# Patient Record
Sex: FEMALE | Race: WHITE | Employment: UNEMPLOYED | URBAN - METROPOLITAN AREA
[De-identification: names, ages, dates, MRNs, and addresses within clinical notes are randomized per-mention and may not be internally consistent; named-entity substitution may affect disease eponyms.]

---

## 2022-07-28 LAB
ANTIBODY SCREEN, EXTERNAL: NEGATIVE
CHLAMYDIA, EXTERNAL: NEGATIVE
HBSAG, EXTERNAL: NEGATIVE
HIV, EXTERNAL: NORMAL
N. GONORRHEA, EXTERNAL: NEGATIVE
RUBELLA, EXTERNAL: NORMAL
T. PALLIDUM, EXTERNAL: NORMAL
TYPE, ABO & RH, EXTERNAL: NORMAL

## 2023-01-30 LAB — GRBS, EXTERNAL: NEGATIVE

## 2023-02-24 ENCOUNTER — HOSPITAL ENCOUNTER (INPATIENT)
Age: 28
LOS: 3 days | Discharge: HOME | End: 2023-02-27
Attending: OBSTETRICS & GYNECOLOGY | Admitting: OBSTETRICS & GYNECOLOGY
Payer: COMMERCIAL

## 2023-02-24 ENCOUNTER — ANESTHESIA EVENT (OUTPATIENT)
Dept: LABOR AND DELIVERY | Age: 28
End: 2023-02-24
Payer: COMMERCIAL

## 2023-02-24 ENCOUNTER — ANESTHESIA (OUTPATIENT)
Dept: LABOR AND DELIVERY | Age: 28
End: 2023-02-24
Payer: COMMERCIAL

## 2023-02-24 DIAGNOSIS — G89.18 POSTOPERATIVE PAIN: Primary | ICD-10-CM

## 2023-02-24 PROBLEM — Z34.90 INTRAUTERINE PREGNANCY: Status: ACTIVE | Noted: 2023-02-24

## 2023-02-24 LAB
ABO + RH BLD: NORMAL
BASOPHILS # BLD: 0 K/UL (ref 0–0.1)
BASOPHILS NFR BLD: 0 % (ref 0–1)
BLOOD GROUP ANTIBODIES SERPL: NORMAL
DIFFERENTIAL METHOD BLD: ABNORMAL
EOSINOPHIL # BLD: 0 K/UL (ref 0–0.4)
EOSINOPHIL NFR BLD: 0 % (ref 0–7)
ERYTHROCYTE [DISTWIDTH] IN BLOOD BY AUTOMATED COUNT: 13.8 % (ref 11.5–14.5)
HCT VFR BLD AUTO: 39.4 % (ref 35–47)
HGB BLD-MCNC: 13.1 G/DL (ref 11.5–16)
IMM GRANULOCYTES # BLD AUTO: 0.1 K/UL (ref 0–0.04)
IMM GRANULOCYTES NFR BLD AUTO: 1 % (ref 0–0.5)
LYMPHOCYTES # BLD: 2.2 K/UL (ref 0.8–3.5)
LYMPHOCYTES NFR BLD: 24 % (ref 12–49)
MCH RBC QN AUTO: 31 PG (ref 26–34)
MCHC RBC AUTO-ENTMCNC: 33.2 G/DL (ref 30–36.5)
MCV RBC AUTO: 93.4 FL (ref 80–99)
MONOCYTES # BLD: 0.7 K/UL (ref 0–1)
MONOCYTES NFR BLD: 8 % (ref 5–13)
NEUTS SEG # BLD: 6.3 K/UL (ref 1.8–8)
NEUTS SEG NFR BLD: 67 % (ref 32–75)
NRBC # BLD: 0 K/UL (ref 0–0.01)
NRBC BLD-RTO: 0 PER 100 WBC
PLATELET # BLD AUTO: 190 K/UL (ref 150–400)
PMV BLD AUTO: 11.2 FL (ref 8.9–12.9)
RBC # BLD AUTO: 4.22 M/UL (ref 3.8–5.2)
SPECIMEN EXP DATE BLD: NORMAL
WBC # BLD AUTO: 9.3 K/UL (ref 3.6–11)

## 2023-02-24 PROCEDURE — 76060000078 HC EPIDURAL ANESTHESIA: Performed by: OBSTETRICS & GYNECOLOGY

## 2023-02-24 PROCEDURE — 65270000029 HC RM PRIVATE

## 2023-02-24 PROCEDURE — 74011000250 HC RX REV CODE- 250: Performed by: OBSTETRICS & GYNECOLOGY

## 2023-02-24 PROCEDURE — 76010000391 HC C SECN FIRST 1 HR: Performed by: OBSTETRICS & GYNECOLOGY

## 2023-02-24 PROCEDURE — 74011250636 HC RX REV CODE- 250/636: Performed by: OBSTETRICS & GYNECOLOGY

## 2023-02-24 PROCEDURE — 65410000002 HC RM PRIVATE OB

## 2023-02-24 PROCEDURE — 77010026065 HC OXYGEN MINIMUM MEDICAL AIR: Performed by: OBSTETRICS & GYNECOLOGY

## 2023-02-24 PROCEDURE — 36415 COLL VENOUS BLD VENIPUNCTURE: CPT

## 2023-02-24 PROCEDURE — 74011250636 HC RX REV CODE- 250/636

## 2023-02-24 PROCEDURE — 74011250637 HC RX REV CODE- 250/637: Performed by: OBSTETRICS & GYNECOLOGY

## 2023-02-24 PROCEDURE — 75410000003 HC RECOV DEL/VAG/CSECN EA 0.5 HR: Performed by: OBSTETRICS & GYNECOLOGY

## 2023-02-24 PROCEDURE — 74011000250 HC RX REV CODE- 250: Performed by: NURSE ANESTHETIST, CERTIFIED REGISTERED

## 2023-02-24 PROCEDURE — 77030007866 HC KT SPN ANES BBMI -B: Performed by: NURSE ANESTHETIST, CERTIFIED REGISTERED

## 2023-02-24 PROCEDURE — 85025 COMPLETE CBC W/AUTO DIFF WBC: CPT

## 2023-02-24 PROCEDURE — 74011250636 HC RX REV CODE- 250/636: Performed by: NURSE ANESTHETIST, CERTIFIED REGISTERED

## 2023-02-24 PROCEDURE — 77010026064 HC OXYGEN INFANT MED AIR MIN: Performed by: OBSTETRICS & GYNECOLOGY

## 2023-02-24 PROCEDURE — 74011000250 HC RX REV CODE- 250

## 2023-02-24 PROCEDURE — 76010000392 HC C SECN EA ADDL 0.5 HR: Performed by: OBSTETRICS & GYNECOLOGY

## 2023-02-24 PROCEDURE — 86900 BLOOD TYPING SEROLOGIC ABO: CPT

## 2023-02-24 RX ORDER — ONDANSETRON 4 MG/1
4 TABLET, ORALLY DISINTEGRATING ORAL
Status: DISCONTINUED | OUTPATIENT
Start: 2023-02-24 | End: 2023-02-27 | Stop reason: HOSPADM

## 2023-02-24 RX ORDER — ONDANSETRON 2 MG/ML
4 INJECTION INTRAMUSCULAR; INTRAVENOUS ONCE
Status: COMPLETED | OUTPATIENT
Start: 2023-02-24 | End: 2023-02-24

## 2023-02-24 RX ORDER — KETOROLAC TROMETHAMINE 30 MG/ML
30 INJECTION, SOLUTION INTRAMUSCULAR; INTRAVENOUS
Status: DISPENSED | OUTPATIENT
Start: 2023-02-24 | End: 2023-02-25

## 2023-02-24 RX ORDER — BUPIVACAINE HYDROCHLORIDE 7.5 MG/ML
INJECTION, SOLUTION INTRASPINAL
Status: DISCONTINUED | OUTPATIENT
Start: 2023-02-24 | End: 2023-02-24

## 2023-02-24 RX ORDER — FERROUS SULFATE 324(65)MG
325 TABLET, DELAYED RELEASE (ENTERIC COATED) ORAL 2 TIMES DAILY WITH MEALS
Qty: 60 TABLET | Refills: 2 | Status: SHIPPED | OUTPATIENT
Start: 2023-02-24

## 2023-02-24 RX ORDER — HYDROMORPHONE HYDROCHLORIDE 1 MG/ML
1 INJECTION, SOLUTION INTRAMUSCULAR; INTRAVENOUS; SUBCUTANEOUS
Status: DISCONTINUED | OUTPATIENT
Start: 2023-02-24 | End: 2023-02-27 | Stop reason: HOSPADM

## 2023-02-24 RX ORDER — DOCUSATE SODIUM 100 MG/1
100 CAPSULE, LIQUID FILLED ORAL 2 TIMES DAILY
Status: DISCONTINUED | OUTPATIENT
Start: 2023-02-24 | End: 2023-02-27 | Stop reason: HOSPADM

## 2023-02-24 RX ORDER — DOCUSATE SODIUM 100 MG/1
100 CAPSULE, LIQUID FILLED ORAL
Qty: 60 CAPSULE | Refills: 1 | Status: SHIPPED | OUTPATIENT
Start: 2023-02-24 | End: 2023-05-25

## 2023-02-24 RX ORDER — OXYTOCIN/RINGER'S LACTATE 30/500 ML
87.3 PLASTIC BAG, INJECTION (ML) INTRAVENOUS AS NEEDED
Status: DISCONTINUED | OUTPATIENT
Start: 2023-02-24 | End: 2023-02-27 | Stop reason: HOSPADM

## 2023-02-24 RX ORDER — OXYTOCIN 10 [USP'U]/ML
INJECTION, SOLUTION INTRAMUSCULAR; INTRAVENOUS AS NEEDED
Status: DISCONTINUED | OUTPATIENT
Start: 2023-02-24 | End: 2023-02-24 | Stop reason: HOSPADM

## 2023-02-24 RX ORDER — SIMETHICONE 80 MG
80 TABLET,CHEWABLE ORAL AS NEEDED
Status: DISCONTINUED | OUTPATIENT
Start: 2023-02-24 | End: 2023-02-27 | Stop reason: HOSPADM

## 2023-02-24 RX ORDER — SODIUM CHLORIDE 0.9 % (FLUSH) 0.9 %
5-40 SYRINGE (ML) INJECTION EVERY 8 HOURS
Status: DISCONTINUED | OUTPATIENT
Start: 2023-02-24 | End: 2023-02-27 | Stop reason: HOSPADM

## 2023-02-24 RX ORDER — FAMOTIDINE 10 MG/ML
INJECTION INTRAVENOUS AS NEEDED
Status: DISCONTINUED | OUTPATIENT
Start: 2023-02-24 | End: 2023-02-24 | Stop reason: HOSPADM

## 2023-02-24 RX ORDER — OXYCODONE HYDROCHLORIDE 5 MG/1
5 TABLET ORAL
Status: DISCONTINUED | OUTPATIENT
Start: 2023-02-24 | End: 2023-02-27 | Stop reason: HOSPADM

## 2023-02-24 RX ORDER — ONDANSETRON 2 MG/ML
INJECTION INTRAMUSCULAR; INTRAVENOUS
Status: DISPENSED
Start: 2023-02-24 | End: 2023-02-25

## 2023-02-24 RX ORDER — SODIUM CHLORIDE 0.9 % (FLUSH) 0.9 %
5-40 SYRINGE (ML) INJECTION EVERY 8 HOURS
Status: DISCONTINUED | OUTPATIENT
Start: 2023-02-24 | End: 2023-02-24 | Stop reason: HOSPADM

## 2023-02-24 RX ORDER — OXYCODONE HYDROCHLORIDE 5 MG/1
10 TABLET ORAL
Status: DISCONTINUED | OUTPATIENT
Start: 2023-02-24 | End: 2023-02-27 | Stop reason: HOSPADM

## 2023-02-24 RX ORDER — EPHEDRINE SULFATE/0.9% NACL/PF 50 MG/5 ML
SYRINGE (ML) INTRAVENOUS
Status: DISPENSED
Start: 2023-02-24 | End: 2023-02-24

## 2023-02-24 RX ORDER — SODIUM CHLORIDE 0.9 % (FLUSH) 0.9 %
5-40 SYRINGE (ML) INJECTION AS NEEDED
Status: DISCONTINUED | OUTPATIENT
Start: 2023-02-24 | End: 2023-02-27 | Stop reason: HOSPADM

## 2023-02-24 RX ORDER — ACETAMINOPHEN 500 MG
1000 TABLET ORAL EVERY 8 HOURS
Status: DISCONTINUED | OUTPATIENT
Start: 2023-02-24 | End: 2023-02-27 | Stop reason: HOSPADM

## 2023-02-24 RX ORDER — FENTANYL CITRATE 50 UG/ML
INJECTION, SOLUTION INTRAMUSCULAR; INTRAVENOUS AS NEEDED
Status: DISCONTINUED | OUTPATIENT
Start: 2023-02-24 | End: 2023-02-24 | Stop reason: HOSPADM

## 2023-02-24 RX ORDER — DIPHENHYDRAMINE HCL 25 MG
25 CAPSULE ORAL
Status: DISCONTINUED | OUTPATIENT
Start: 2023-02-24 | End: 2023-02-27 | Stop reason: HOSPADM

## 2023-02-24 RX ORDER — SODIUM CHLORIDE, SODIUM LACTATE, POTASSIUM CHLORIDE, CALCIUM CHLORIDE 600; 310; 30; 20 MG/100ML; MG/100ML; MG/100ML; MG/100ML
1000 INJECTION, SOLUTION INTRAVENOUS CONTINUOUS
Status: DISCONTINUED | OUTPATIENT
Start: 2023-02-24 | End: 2023-02-24 | Stop reason: HOSPADM

## 2023-02-24 RX ORDER — OXYCODONE AND ACETAMINOPHEN 5; 325 MG/1; MG/1
1 TABLET ORAL
Qty: 12 TABLET | Refills: 0 | Status: SHIPPED | OUTPATIENT
Start: 2023-02-24 | End: 2023-02-27

## 2023-02-24 RX ORDER — LIDOCAINE HYDROCHLORIDE 10 MG/ML
INJECTION, SOLUTION EPIDURAL; INFILTRATION; INTRACAUDAL; PERINEURAL
Status: SHIPPED | OUTPATIENT
Start: 2023-02-24 | End: 2023-02-24

## 2023-02-24 RX ORDER — OXYTOCIN/RINGER'S LACTATE 30/500 ML
10 PLASTIC BAG, INJECTION (ML) INTRAVENOUS AS NEEDED
Status: DISCONTINUED | OUTPATIENT
Start: 2023-02-24 | End: 2023-02-27 | Stop reason: HOSPADM

## 2023-02-24 RX ORDER — DEXAMETHASONE SODIUM PHOSPHATE 4 MG/ML
INJECTION, SOLUTION INTRA-ARTICULAR; INTRALESIONAL; INTRAMUSCULAR; INTRAVENOUS; SOFT TISSUE AS NEEDED
Status: DISCONTINUED | OUTPATIENT
Start: 2023-02-24 | End: 2023-02-24 | Stop reason: HOSPADM

## 2023-02-24 RX ORDER — ONDANSETRON 2 MG/ML
INJECTION INTRAMUSCULAR; INTRAVENOUS AS NEEDED
Status: DISCONTINUED | OUTPATIENT
Start: 2023-02-24 | End: 2023-02-24 | Stop reason: HOSPADM

## 2023-02-24 RX ORDER — MORPHINE SULFATE 0.5 MG/ML
INJECTION, SOLUTION EPIDURAL; INTRATHECAL; INTRAVENOUS AS NEEDED
Status: DISCONTINUED | OUTPATIENT
Start: 2023-02-24 | End: 2023-02-24 | Stop reason: HOSPADM

## 2023-02-24 RX ORDER — EPHEDRINE SULFATE/0.9% NACL/PF 50 MG/5 ML
SYRINGE (ML) INTRAVENOUS AS NEEDED
Status: DISCONTINUED | OUTPATIENT
Start: 2023-02-24 | End: 2023-02-24 | Stop reason: HOSPADM

## 2023-02-24 RX ORDER — IBUPROFEN 800 MG/1
800 TABLET ORAL EVERY 8 HOURS
Status: DISCONTINUED | OUTPATIENT
Start: 2023-02-24 | End: 2023-02-25

## 2023-02-24 RX ORDER — SODIUM CHLORIDE 0.9 % (FLUSH) 0.9 %
5-40 SYRINGE (ML) INJECTION AS NEEDED
Status: DISCONTINUED | OUTPATIENT
Start: 2023-02-24 | End: 2023-02-24 | Stop reason: HOSPADM

## 2023-02-24 RX ORDER — BUPIVACAINE HYDROCHLORIDE 7.5 MG/ML
INJECTION, SOLUTION EPIDURAL; RETROBULBAR AS NEEDED
Status: DISCONTINUED | OUTPATIENT
Start: 2023-02-24 | End: 2023-02-24 | Stop reason: HOSPADM

## 2023-02-24 RX ORDER — SODIUM CHLORIDE, SODIUM LACTATE, POTASSIUM CHLORIDE, CALCIUM CHLORIDE 600; 310; 30; 20 MG/100ML; MG/100ML; MG/100ML; MG/100ML
75 INJECTION, SOLUTION INTRAVENOUS CONTINUOUS
Status: DISCONTINUED | OUTPATIENT
Start: 2023-02-24 | End: 2023-02-27 | Stop reason: HOSPADM

## 2023-02-24 RX ORDER — NALOXONE HYDROCHLORIDE 0.4 MG/ML
0.4 INJECTION, SOLUTION INTRAMUSCULAR; INTRAVENOUS; SUBCUTANEOUS AS NEEDED
Status: DISCONTINUED | OUTPATIENT
Start: 2023-02-24 | End: 2023-02-27 | Stop reason: HOSPADM

## 2023-02-24 RX ORDER — ACETAMINOPHEN 500 MG
1000 TABLET ORAL EVERY 8 HOURS
Qty: 60 TABLET | Refills: 1 | Status: SHIPPED | OUTPATIENT
Start: 2023-02-24

## 2023-02-24 RX ORDER — IBUPROFEN 800 MG/1
800 TABLET ORAL EVERY 8 HOURS
Qty: 30 TABLET | Refills: 1 | Status: SHIPPED | OUTPATIENT
Start: 2023-02-24

## 2023-02-24 RX ADMIN — OXYTOCIN 20 UNITS: 10 INJECTION, SOLUTION INTRAMUSCULAR; INTRAVENOUS at 10:32

## 2023-02-24 RX ADMIN — Medication 10 MG: at 09:57

## 2023-02-24 RX ADMIN — BUPIVACAINE HYDROCHLORIDE 1.6 ML: 7.5 INJECTION, SOLUTION EPIDURAL; RETROBULBAR at 09:50

## 2023-02-24 RX ADMIN — MORPHINE SULFATE 200 MCG: 0.5 INJECTION, SOLUTION EPIDURAL; INTRATHECAL; INTRAVENOUS at 09:50

## 2023-02-24 RX ADMIN — SODIUM CHLORIDE, PRESERVATIVE FREE 10 ML: 5 INJECTION INTRAVENOUS at 18:02

## 2023-02-24 RX ADMIN — FAMOTIDINE 20 MG: 10 INJECTION INTRAVENOUS at 09:15

## 2023-02-24 RX ADMIN — DOCUSATE SODIUM 100 MG: 100 CAPSULE, LIQUID FILLED ORAL at 13:08

## 2023-02-24 RX ADMIN — DEXAMETHASONE SODIUM PHOSPHATE 4 MG: 4 INJECTION, SOLUTION INTRAMUSCULAR; INTRAVENOUS at 10:04

## 2023-02-24 RX ADMIN — ONDANSETRON 4 MG: 2 INJECTION INTRAMUSCULAR; INTRAVENOUS at 16:00

## 2023-02-24 RX ADMIN — OXYTOCIN 20 UNITS: 10 INJECTION, SOLUTION INTRAMUSCULAR; INTRAVENOUS at 10:12

## 2023-02-24 RX ADMIN — ONDANSETRON 4 MG: 4 TABLET, ORALLY DISINTEGRATING ORAL at 13:08

## 2023-02-24 RX ADMIN — Medication 10 MG: at 10:14

## 2023-02-24 RX ADMIN — LIDOCAINE HYDROCHLORIDE 5 MG: 10 INJECTION, SOLUTION EPIDURAL; INFILTRATION; INTRACAUDAL; PERINEURAL at 09:46

## 2023-02-24 RX ADMIN — Medication 10 MG: at 10:00

## 2023-02-24 RX ADMIN — SODIUM CHLORIDE, POTASSIUM CHLORIDE, SODIUM LACTATE AND CALCIUM CHLORIDE 75 ML/HR: 600; 310; 30; 20 INJECTION, SOLUTION INTRAVENOUS at 17:00

## 2023-02-24 RX ADMIN — PHENYLEPHRINE HYDROCHLORIDE 80 MCG: 10 INJECTION INTRAVENOUS at 09:53

## 2023-02-24 RX ADMIN — KETOROLAC TROMETHAMINE 30 MG: 30 INJECTION, SOLUTION INTRAMUSCULAR at 13:08

## 2023-02-24 RX ADMIN — SIMETHICONE 80 MG: 80 TABLET, CHEWABLE ORAL at 13:08

## 2023-02-24 RX ADMIN — SODIUM CHLORIDE, POTASSIUM CHLORIDE, SODIUM LACTATE AND CALCIUM CHLORIDE 1000 ML: 600; 310; 30; 20 INJECTION, SOLUTION INTRAVENOUS at 08:24

## 2023-02-24 RX ADMIN — FENTANYL CITRATE 10 MCG: 50 INJECTION, SOLUTION INTRAMUSCULAR; INTRAVENOUS at 09:50

## 2023-02-24 RX ADMIN — ONDANSETRON HYDROCHLORIDE 4 MG: 2 SOLUTION INTRAMUSCULAR; INTRAVENOUS at 09:15

## 2023-02-24 RX ADMIN — KETOROLAC TROMETHAMINE 30 MG: 30 INJECTION, SOLUTION INTRAMUSCULAR at 19:57

## 2023-02-24 RX ADMIN — SODIUM CHLORIDE, POTASSIUM CHLORIDE, SODIUM LACTATE AND CALCIUM CHLORIDE: 600; 310; 30; 20 INJECTION, SOLUTION INTRAVENOUS at 10:32

## 2023-02-24 RX ADMIN — Medication 10 MG: at 09:54

## 2023-02-24 RX ADMIN — CEFAZOLIN SODIUM 2 G: 1 POWDER, FOR SOLUTION INTRAMUSCULAR; INTRAVENOUS at 09:51

## 2023-02-24 RX ADMIN — Medication 10 MG: at 09:49

## 2023-02-24 NOTE — OP NOTES
Operative Note    Patient: Natalia Tavarez  YOB: 1995  MRN: [id number]    Date of Procedure: 2023     Pre-Op Diagnosis:  delivery indicated due to breech presentation [O32.1XX0]    Post-Op Diagnosis: Same as preoperative diagnosis. Procedure(s):  Primary Low Transverse  SECTION    Surgeon(s):  Wali Barnes MD    Surgical Assistant: Surg Asst-1: Mykel Holm    Anesthesia: Spinal     Estimated Blood Loss (mL):  991    Complications: None    Specimens: None    Findings: Viable male infant in breech presentation. Tight Nuchal cord x3. Detailed Description of Procedure: The patient was taken to the operating room where spinal anesthesia was administered and found to be adequate. Two grams of ancef we administered for infection prophylaxis. She was placed in the dorsal supine position with a leftward tilt, then prepped and draped in the usual sterile fashion. A Pfannenstiel skin incision was then made two fingerbreadths above the pubic bone with a scalpel and carried through to the underlying fascia using the Bovie. The fascia was then scored in the midline and extended laterally using gregg scissors . The superior aspect of the fascia was then grasped with Kocher clamps x2, tented up, and the rectus muscles were dissected off using Gregg scissors. In a similar fashion, rectus muscles were dissected off of the inferior portion of the fascial incision. The rectus muscle was  at the midline down to the level of the pubic symphysis. The peritoneum was then identified and found to be free of adherent bowel and entered bluntly with the surgeons digit. It was then extended with lateral and upward traction. Intraabdominal survey revealed scant clear peritoneal fluid and thinned out lower uterine segment.  The bladder blade was then inserted and the vesicouterine peritoneum was then identified, grasped with smooth pickups and entered sharply using Metzenbaum scissors. A bladder flap was then created and a bladder blade was then repositioned to ensure that the bladder was kept out of the operative field. A low transverse incision was then made with a scalpel. The uterine incision was then extended with lateral and upward traction. The amniotic sac was ruptured bluntly and clear fluid was noted The infant was found to be in the breech position. The surgeons hand was then inserted into the uterine cavity and the presenting part was brought to the level of the uterine incision. The bladder blade was removed and the infants buttocks were delivered followed by the infants body and head using a wet blue towel without difficulty. Tight nuchal cord x3 was reduced. The cord was then clamped and cut after delayed cord clamping for 60 seconds, and the infant was handed off to the awaiting nursing staff. Cord blood was also collected and sent for analysis. The placenta was delivered intact with manual massage of the uterine fundus. IV oxytocin was initiated to facilitate uterine contraction. The uterus was then exteriorized from the abdomen and all clots and debris were then removed with a moist lap. The bladder blade was then inserted and the uterine incision site was then closed using a 1-0 vicryl in a running locked fashion with a second imbricating layer with 0 monocryl. An additional figure of eight stich was placed at the hysterotomy site for additional hemostasis with 2-0 monocryl. The uterus was found to be hemostatic. Bilateral tubes and ovaries were found to be normal. The posterior cul-de-sac was then irrigated and all clots and debris were removed. The bladder blade was removed. The uterus was returned to the abdominal cavity. The paracolic gutters were then irrigated. All clots and debris were then removed. Bladder blade was then reinserted. The uterine incision site was reinspected and found to be hemostatic.  The fascia was then closed using a 0 Vicryl in a running fashion. The subcutaneous space was closed with 3-0 vicryl in a running fashion. The skin was closed using a 3-0 monocryl. All sponge, lap, and instrument counts were correct x2. The patient tolerated the procedure well and was taken to recovery in stable condition.       Electronically Signed by Cesar Lopez MD on 2/24/2023 at 10:58 AM

## 2023-02-24 NOTE — ANESTHESIA PROCEDURE NOTES
Spinal Block    Start time: 2/24/2023 9:43 AM  End time: 2/24/2023 9:50 AM  Performed by: Jasson Mcgrath CRNA  Authorized by: Jasson Mcgrath CRNA     Pre-procedure:  Preanesthetic Checklist: patient identified, risks and benefits discussed, anesthesia consent, site marked, patient being monitored, timeout performed and fire risk safety assessment completed and verbalized    Timeout Time: 09:43 EST      Spinal Block:   Patient Position:  Seated  Prep Region:  Lumbar      Location:  L2-3  Technique:  Single shot  Local: lidocaine (PF) (XYLOCAINE) 10 mg/mL (1 %) IntraDERMAL - IntraDERMal, Back   5 mg - 2/24/2023 9:46:00 AM    Med Admin Time: 2/24/2023 9:50 AM    Needle:   Needle Type:  Pencan  Needle Gauge:  25 G  Attempts:  1      Events: CSF confirmed and no blood with aspiration        Assessment:  Insertion:  Uncomplicated  Patient tolerance:  Patient tolerated the procedure well with no immediate complications  Atraumatic spinal.

## 2023-02-24 NOTE — PROGRESS NOTES
[id number] Patient arrived on unit for scheduled . Labs sent, admission and assessment done. Patient anxious and lost consciousness briefly during lab draw. Placed in trendelenburg and bolused IV fluids for hypotension (see flowsheet). Patient recovered but remains anxious. Will continue to monitor. Anesthesia and Dr. Willy Owen made aware of syncopal episode. 1240 Set patient up with breast pump and provided basic education on pumping and cleaning the flanges, etc. Order in for lactation consultant. 1450 Patient nauseated when sitting on side of bed. BP WNL. Will let patient rest.  1500 Patient wanted to stand at side of bed; was able to ambulate to wheelchair nearby and states she feels much better. 1520 Patient wheeled to NICU to see her baby. 1600 Patient wheeled back to room; c/o of nausea. Asked for saltines to try and help settle her stomach. 1645 Patient vomited but has no other complaints. IV fluids started and patient is currently NPO. Will start/advance diet as tolerated. 1857 Bedside SBAR given to OLVIN Brown RN

## 2023-02-24 NOTE — PROGRESS NOTES
2023  2:13 PM    CM met with NAYELI to complete initial assessment and begin discharge planning. MOB verified and confirmed demographics. NAYELI lives with Tirsh Cervantes, at the address on file. NAYELI reports she has good family support, and feels like she has the support she needs when she returns home. NAYELI plans to breast feed baby and has pump to use at home. [facility], will provide follow up care for infant. NAYELI has car seat, bassinet/crib, clothing, bottles and all necessary supplies for baby.     39+4 week infant admitted s/p scheduled  for breech presentation due to respiratory distress requiring CPAP. Infant admitted to NICU on bCPAP, IVF while NPO and receiving empiric antibiotics while on sepsis rule out for respiratory distress. CM following for needs. Care Management Interventions  PCP Verified by CM: Yes ([place])  Mode of Transport at Discharge:  Other (see comment)  Transition of Care Consult (CM Consult): Discharge Planning  Support Systems: Spouse/Significant Other, Other Family Member(s)  Confirm Follow Up Transport: Family  Discharge Location  Patient Expects to be Discharged to[de-identified] Home with family assistance  Patrick Ontiveros

## 2023-02-24 NOTE — ANESTHESIA PREPROCEDURE EVALUATION
Relevant Problems   No relevant active problems       Anesthetic History   No history of anesthetic complications            Review of Systems / Medical History  Patient summary reviewed and pertinent labs reviewed    Pulmonary  Within defined limits                 Neuro/Psych         Psychiatric history    Comments: anxiety Cardiovascular  Within defined limits                     GI/Hepatic/Renal  Within defined limits              Endo/Other  Within defined limits           Other Findings   Comments: Breech presentation; C section  G1           Physical Exam    Airway  Mallampati: II    Neck ROM: normal range of motion   Mouth opening: Normal     Cardiovascular    Rhythm: regular  Rate: normal         Dental    Dentition: Lower dentition intact and Upper dentition intact     Pulmonary  Breath sounds clear to auscultation               Abdominal  GI exam deferred       Other Findings            Anesthetic Plan    ASA: 2  Anesthesia type: spinal          Induction: Intravenous  Anesthetic plan and risks discussed with: Patient and Spouse

## 2023-02-24 NOTE — H&P
History & Physical    Name: Isabel Suarez MRN: [id number]  SSN: [id number]    YOB: 1995  Age: 32 y.o. Sex: female      Subjective:     Estimated Date of Delivery: None noted. OB History    Para Term  AB Living   1             SAB IAB Ectopic Molar Multiple Live Births                    # Outcome Date GA Lbr Jass/2nd Weight Sex Delivery Anes PTL Lv   1 Current                Ms. Donny Kirkland admitted with pregnancy at 39.4 wks for  section due to breech. Prenatal course was normal. Please see prenatal records for details. OB Problem list:  - Breech presentation: declines ECV  - Anxiety   - Elevated blood-pressure reading, without diagnosis of hypertension: P/C ratio 0.15      No past medical history on file. No past surgical history on file. Social History     Occupational History    Not on file   Tobacco Use    Smoking status: Not on file    Smokeless tobacco: Not on file   Substance and Sexual Activity    Alcohol use: Not on file    Drug use: Not on file    Sexual activity: Not on file     No family history on file. No Known Allergies  Prior to Admission medications    Not on File        Review of Systems: A comprehensive review of systems was negative except for that written in the History of Present Illness. Objective:     Vitals:  pending    Physical Exam:  Patient without distress. Lung: normal respiratory effort  Abdomen: soft, nontender  Lower Extremities:  - Edema trace  Membranes:  Intact  Fetal Heart Rate: Pending    Prenatal Labs:   See Prenatal record      Impression/Plan:     Plan:  Admit for  section. Group B Strep was negative. 2g of ancef ordered for infetion ppx. Discussed the risks of surgery including the risks of bleeding, infection, deep vein thrombosis, and surgical injuries to internal organs including but not limited to the bowels, bladder, rectum, and female reproductive organs.  The patient understands the risks; any and all questions were answered to the patient's satisfaction.

## 2023-02-24 NOTE — PROGRESS NOTES
1900: Bedside and Verbal shift change report given to OLVIN Churchill, RN (oncoming nurse) by TORO Flores RN (offgoing nurse). Report included the following information SBAR, Intake/Output, MAR, Recent Results, and Quality Measures. 1905: Patient plans to pump and will call this RN when finished. 2015: Patient provided with breast pump supplies and pumping education. Patient states she is tolerating PO ice, requests ginger ale and crackers. 2130: This RN assisting patient and FOB to NICU to visit infant. 2245: Patient tolerating PO liquid and solids. IV fluids stopped. 0425: Patient states her pain is well controlled with Ibuprofen. Declines additional needs at this time. 0700: Bedside and Verbal shift change report given to BUTCH Bowers RN (oncoming nurse) by Isrrael Howell RN (offgoing nurse). Report included the following information SBAR, Intake/Output, MAR, Recent Results, and Quality Measures.

## 2023-02-25 LAB
BASOPHILS # BLD: 0 K/UL (ref 0–0.1)
BASOPHILS NFR BLD: 0 % (ref 0–1)
DIFFERENTIAL METHOD BLD: ABNORMAL
EOSINOPHIL # BLD: 0 K/UL (ref 0–0.4)
EOSINOPHIL NFR BLD: 0 % (ref 0–7)
ERYTHROCYTE [DISTWIDTH] IN BLOOD BY AUTOMATED COUNT: 14 % (ref 11.5–14.5)
HCT VFR BLD AUTO: 27.7 % (ref 35–47)
HGB BLD-MCNC: 9.1 G/DL (ref 11.5–16)
IMM GRANULOCYTES # BLD AUTO: 0.1 K/UL (ref 0–0.04)
IMM GRANULOCYTES NFR BLD AUTO: 1 % (ref 0–0.5)
LYMPHOCYTES # BLD: 1.9 K/UL (ref 0.8–3.5)
LYMPHOCYTES NFR BLD: 12 % (ref 12–49)
MCH RBC QN AUTO: 30.7 PG (ref 26–34)
MCHC RBC AUTO-ENTMCNC: 32.9 G/DL (ref 30–36.5)
MCV RBC AUTO: 93.6 FL (ref 80–99)
MONOCYTES # BLD: 1.3 K/UL (ref 0–1)
MONOCYTES NFR BLD: 8 % (ref 5–13)
NEUTS SEG # BLD: 12.9 K/UL (ref 1.8–8)
NEUTS SEG NFR BLD: 79 % (ref 32–75)
NRBC # BLD: 0 K/UL (ref 0–0.01)
NRBC BLD-RTO: 0 PER 100 WBC
PLATELET # BLD AUTO: 161 K/UL (ref 150–400)
PMV BLD AUTO: 11.5 FL (ref 8.9–12.9)
RBC # BLD AUTO: 2.96 M/UL (ref 3.8–5.2)
WBC # BLD AUTO: 16.2 K/UL (ref 3.6–11)

## 2023-02-25 PROCEDURE — 74011250637 HC RX REV CODE- 250/637: Performed by: OBSTETRICS & GYNECOLOGY

## 2023-02-25 PROCEDURE — 36415 COLL VENOUS BLD VENIPUNCTURE: CPT

## 2023-02-25 PROCEDURE — 77030036554

## 2023-02-25 PROCEDURE — 65270000029 HC RM PRIVATE

## 2023-02-25 PROCEDURE — 85025 COMPLETE CBC W/AUTO DIFF WBC: CPT

## 2023-02-25 PROCEDURE — 65410000002 HC RM PRIVATE OB

## 2023-02-25 PROCEDURE — 74011250637 HC RX REV CODE- 250/637: Performed by: ADVANCED PRACTICE MIDWIFE

## 2023-02-25 RX ORDER — IBUPROFEN 800 MG/1
800 TABLET ORAL EVERY 8 HOURS
Status: DISCONTINUED | OUTPATIENT
Start: 2023-02-25 | End: 2023-02-27 | Stop reason: HOSPADM

## 2023-02-25 RX ADMIN — IBUPROFEN 800 MG: 800 TABLET, FILM COATED ORAL at 18:08

## 2023-02-25 RX ADMIN — OXYCODONE HYDROCHLORIDE 5 MG: 5 TABLET ORAL at 21:57

## 2023-02-25 RX ADMIN — DOCUSATE SODIUM 100 MG: 100 CAPSULE, LIQUID FILLED ORAL at 10:01

## 2023-02-25 RX ADMIN — IBUPROFEN 800 MG: 800 TABLET, FILM COATED ORAL at 02:21

## 2023-02-25 RX ADMIN — OXYCODONE HYDROCHLORIDE 5 MG: 5 TABLET ORAL at 13:50

## 2023-02-25 RX ADMIN — OXYCODONE HYDROCHLORIDE 5 MG: 5 TABLET ORAL at 18:08

## 2023-02-25 RX ADMIN — DOCUSATE SODIUM 100 MG: 100 CAPSULE, LIQUID FILLED ORAL at 18:08

## 2023-02-25 RX ADMIN — IBUPROFEN 800 MG: 800 TABLET, FILM COATED ORAL at 10:00

## 2023-02-25 NOTE — PROGRESS NOTES
1609 Received report from BUTCH Wilburn, RN. Pt in NICU, no s/s of distress at this time. 1730 Pt in room, no s/s of distress, fundus firm at umbilicus with scant bleeding. All needs met at this time. 1927 Bedside and Verbal shift change report given to Toñito Bliss RN (oncoming nurse) by Jeferson Mckeon RN (offgoing nurse). Report included the following information SBAR, Kardex, Intake/Output, and MAR.

## 2023-02-25 NOTE — LACTATION NOTE
This note was copied from a baby's chart. This is mother's first baby. She attended a breastfeeding class. Baby is in the NICU - mother has been pumping Q 2-3 hours and is getting drops of colostrum. Discussed with mother her plan for feeding. Reviewed the benefits of exclusive breast milk feeding during the hospital stay. Informed her of the risks of using formula to supplement in the first few days of life as well as the benefits of successful breast milk feeding; referred her to the Breastfeeding booklet about this information. She acknowledges understanding of information reviewed and states that it is her plan to breastfeed her infant. Will support her choice and offer additional information as needed. Pumping:  Guidelines for pumping, milk collection and storage, proper cleaning of pump parts all reviewed. How to establish and maintain breast milk supply through pumping reviewed. Differences between hospital grade rental pumps vs store bought double electric/hand pumps discussed. Set up pumping with double electric set up. List of area pump rental locations and lactation support services provided. Mother has a breast pump for home use. Infant admitted to NICU. Mother will successfully establish breast milk supply by pumping with a hospital grade pump every 2-3 hours for approximately 20 minutes/8-10 x day. To maximize milk production mom taught to incorporate breast massage before and hand expression after each pumping session. All expressed breast milk (EBM) will be provided for infant(s) use. The value of skin to skin bonding emphasized. The breast will be offered as baby is ready; with the goal of eventual transition to breastfeeding. Importance of maintaining milk supply through pumping emphasized as infant(s) learns to nurse. Discussed eating a healthy diet. Instructed mother to eat a variety of foods in order to get a well balanced diet.  She should consume an extra 500 calories per day (more than her non-pregnant requirement.) These extra calories will help provide energy needed for optimal breast milk production. Mother also encouraged to \"drink to thirst\" and it is recommended that she drink fluids such as water, fruit/vegetable juice. Nutritious snacks should be available so that she can eat throughout the day to help satisfy her hunger and maintain a good milk supply. Engorgement Care Guidelines:  Reviewed how milk is made and normal phases of milk production. Taught care of engorged breasts - pumping Q 2-3 hours encouraged with use of cool packs (no ice directly on skin). Consider use of NSAIDS where appropriate for discomfort and inflammation. Can employ light touch, lymphatic drainage techniques on tender grandular tissues. Anticipatory guidance shared. Breast Assessment  Left Breast: Medium  Left Nipple: Everted, Intact  Right Breast: Medium  Right Nipple: Everted, Intact  Breast- Feeding Assessment  Attends Breast-Feeding Classes: Yes  Breast-Feeding Experience: No  Breast Trauma/Surgery: No  Lactation Consultant Visits  Breast-Feedings: Not breast-feeding (Mother is pumping for her baby who is in the NICU.)      Breastfeeding handouts, support group and LC# given.

## 2023-02-25 NOTE — PROGRESS NOTES
Post-Operative  Day 1    Reggie Kin     Assessment: Post-Op day 1, stable    Plan:     - Routine post-operative care. - Male infant in NICU. Desires circ. The risks and benefits of the circumcision  procedure and anesthesia including: bleeding, infection, variability of cosmetic results were discussed at length with the mother. She is aware that future repeat procedures may be necessary. She gives informed consent to proceed as noted and her questions are answered. - Acute blood loss anemia. Hb 9.1. Plan to start Fe at discharge if pt anemic.  - Ambulate today. Information for the patient's :  Oh Long, Male Martie Blizzard [[id number]]   , Low Transverse   Patient doing well without significant complaint. Tolerating diet. [place] out. Ambulating. Denies fever, chills, CP, SOB, calf pain/tenderness. Vitals:  Visit Vitals  /63 (BP 1 Location: Left upper arm)   Pulse 75   Temp 98.3 °F (36.8 °C)   Resp 16   Ht 5' 2\" (1.575 m)   Wt 79.4 kg (175 lb)   SpO2 100%   Breastfeeding Unknown   BMI 32.01 kg/m²     Temp (24hrs), Av.9 °F (36.6 °C), Min:97.3 °F (36.3 °C), Max:98.3 °F (36.8 °C)    Exam:     Patient without distress. Fundus firm, nontender per nursing fundal checks. Incision bandaged, clean, dry, intact. Perineum with normal lochia noted per nursing assessment. Lower extremities are negative for pathological edema.     Labs:   Lab Results   Component Value Date/Time    WBC 16.2 (H) 2023 04:22 AM    WBC 9.3 2023 07:59 AM    HGB 9.1 (L) 2023 04:22 AM    HGB 13.1 2023 07:59 AM    HCT 27.7 (L) 2023 04:22 AM    HCT 39.4 2023 07:59 AM    PLATELET 958  04:22 AM    PLATELET 840  07:59 AM       Recent Results (from the past 24 hour(s))   CBC WITH AUTOMATED DIFF    Collection Time: 23  4:22 AM   Result Value Ref Range    WBC 16.2 (H) 3.6 - 11.0 K/uL    RBC 2.96 (L) 3.80 - 5.20 M/uL HGB 9.1 (L) 11.5 - 16.0 g/dL    HCT 27.7 (L) 35.0 - 47.0 %    MCV 93.6 80.0 - 99.0 FL    MCH 30.7 26.0 - 34.0 PG    MCHC 32.9 30.0 - 36.5 g/dL    RDW 14.0 11.5 - 14.5 %    PLATELET 854 317 - 640 K/uL    MPV 11.5 8.9 - 12.9 FL    NRBC 0.0 0  WBC    ABSOLUTE NRBC 0.00 0.00 - 0.01 K/uL    NEUTROPHILS 79 (H) 32 - 75 %    LYMPHOCYTES 12 12 - 49 %    MONOCYTES 8 5 - 13 %    EOSINOPHILS 0 0 - 7 %    BASOPHILS 0 0 - 1 %    IMMATURE GRANULOCYTES 1 (H) 0.0 - 0.5 %    ABS. NEUTROPHILS 12.9 (H) 1.8 - 8.0 K/UL    ABS. LYMPHOCYTES 1.9 0.8 - 3.5 K/UL    ABS. MONOCYTES 1.3 (H) 0.0 - 1.0 K/UL    ABS. EOSINOPHILS 0.0 0.0 - 0.4 K/UL    ABS. BASOPHILS 0.0 0.0 - 0.1 K/UL    ABS. IMM.  GRANS. 0.1 (H) 0.00 - 0.04 K/UL    DF AUTOMATED

## 2023-02-26 PROCEDURE — 74011250637 HC RX REV CODE- 250/637: Performed by: OBSTETRICS & GYNECOLOGY

## 2023-02-26 PROCEDURE — 74011250637 HC RX REV CODE- 250/637: Performed by: ADVANCED PRACTICE MIDWIFE

## 2023-02-26 PROCEDURE — 65270000029 HC RM PRIVATE

## 2023-02-26 RX ADMIN — OXYCODONE HYDROCHLORIDE 5 MG: 5 TABLET ORAL at 10:46

## 2023-02-26 RX ADMIN — OXYCODONE HYDROCHLORIDE 5 MG: 5 TABLET ORAL at 23:33

## 2023-02-26 RX ADMIN — OXYCODONE HYDROCHLORIDE 5 MG: 5 TABLET ORAL at 18:43

## 2023-02-26 RX ADMIN — DOCUSATE SODIUM 100 MG: 100 CAPSULE, LIQUID FILLED ORAL at 18:43

## 2023-02-26 RX ADMIN — IBUPROFEN 800 MG: 800 TABLET, FILM COATED ORAL at 18:43

## 2023-02-26 RX ADMIN — OXYCODONE HYDROCHLORIDE 5 MG: 5 TABLET ORAL at 02:10

## 2023-02-26 RX ADMIN — OXYCODONE HYDROCHLORIDE 5 MG: 5 TABLET ORAL at 14:56

## 2023-02-26 RX ADMIN — IBUPROFEN 800 MG: 800 TABLET, FILM COATED ORAL at 10:46

## 2023-02-26 RX ADMIN — IBUPROFEN 800 MG: 800 TABLET, FILM COATED ORAL at 02:10

## 2023-02-26 RX ADMIN — ACETAMINOPHEN 1000 MG: 500 TABLET ORAL at 18:49

## 2023-02-26 RX ADMIN — OXYCODONE HYDROCHLORIDE 5 MG: 5 TABLET ORAL at 06:26

## 2023-02-26 RX ADMIN — ACETAMINOPHEN 1000 MG: 500 TABLET ORAL at 10:46

## 2023-02-26 RX ADMIN — DOCUSATE SODIUM 100 MG: 100 CAPSULE, LIQUID FILLED ORAL at 10:47

## 2023-02-26 NOTE — PROGRESS NOTES
Post-Operative  Day 2    Felicia Chen     Assessment: Post-Op day 2, doing well    Plan:   - Routine post-operative care. - Acute blood loss anemia. Hc stable. - Plan for discharge [facility] Discharge Date: Tomorrow. Information for the patient's :  Sue Hoffman, Male Nita Lopez [[id number]]   , Low Transverse   Patient doing well without significant complaint. Tolerating regular diet. Ambulating. Voiding without difficulty. Denies fever, chills, CP, SOB, calf pain/tenderness. Tolerating diet. Vitals:  Visit Vitals  /62   Pulse 85   Temp 98.1 °F (36.7 °C)   Resp 16   Ht 5' 2\" (1.575 m)   Wt 79.4 kg (175 lb)   SpO2 99%   Breastfeeding Unknown   BMI 32.01 kg/m²     Temp (24hrs), Av.1 °F (36.7 °C), Min:97.4 °F (36.3 °C), Max:98.7 °F (37.1 °C)        Exam:       Patient without distress. Fundus firm, nontender per nursing fundal checks. Bandage removed. Clean, dry, intact. Perineum with normal lochia noted per nursing assessment. Lower extremities are negative for pathological edema. Labs:   Lab Results   Component Value Date/Time    WBC 16.2 (H) 2023 04:22 AM    WBC 9.3 2023 07:59 AM    HGB 9.1 (L) 2023 04:22 AM    HGB 13.1 2023 07:59 AM    HCT 27.7 (L) 2023 04:22 AM    HCT 39.4 2023 07:59 AM    PLATELET 104  04:22 AM    PLATELET 877  07:59 AM       No results found for this or any previous visit (from the past 24 hour(s)).

## 2023-02-26 NOTE — ROUTINE PROCESS
Bedside and Verbal shift change report given to Woodrow Lyle RN (oncoming nurse) by Darcy Vaca RN (offgoing nurse). Report included the following information SBAR, Kardex, Intake/Output, and MAR.

## 2023-02-26 NOTE — ROUTINE PROCESS
1400: Mother & infant transferred to MIU for routine transfer of care.  SBAR report given to Eloy Luz RN

## 2023-02-27 VITALS
RESPIRATION RATE: 16 BRPM | OXYGEN SATURATION: 98 % | TEMPERATURE: 98.6 F | HEART RATE: 72 BPM | WEIGHT: 175 LBS | BODY MASS INDEX: 32.2 KG/M2 | HEIGHT: 62 IN | DIASTOLIC BLOOD PRESSURE: 74 MMHG | SYSTOLIC BLOOD PRESSURE: 112 MMHG

## 2023-02-27 PROCEDURE — 74011250637 HC RX REV CODE- 250/637: Performed by: OBSTETRICS & GYNECOLOGY

## 2023-02-27 PROCEDURE — 74011250637 HC RX REV CODE- 250/637: Performed by: ADVANCED PRACTICE MIDWIFE

## 2023-02-27 RX ADMIN — IBUPROFEN 800 MG: 800 TABLET, FILM COATED ORAL at 11:48

## 2023-02-27 RX ADMIN — OXYCODONE HYDROCHLORIDE 5 MG: 5 TABLET ORAL at 07:33

## 2023-02-27 RX ADMIN — OXYCODONE HYDROCHLORIDE 5 MG: 5 TABLET ORAL at 03:14

## 2023-02-27 RX ADMIN — ACETAMINOPHEN 1000 MG: 500 TABLET ORAL at 03:11

## 2023-02-27 RX ADMIN — DOCUSATE SODIUM 100 MG: 100 CAPSULE, LIQUID FILLED ORAL at 09:15

## 2023-02-27 RX ADMIN — OXYCODONE HYDROCHLORIDE 5 MG: 5 TABLET ORAL at 11:48

## 2023-02-27 RX ADMIN — ACETAMINOPHEN 1000 MG: 500 TABLET ORAL at 11:48

## 2023-02-27 RX ADMIN — SIMETHICONE 80 MG: 80 TABLET, CHEWABLE ORAL at 09:15

## 2023-02-27 RX ADMIN — IBUPROFEN 800 MG: 800 TABLET, FILM COATED ORAL at 03:12

## 2023-02-27 NOTE — ROUTINE PROCESS
Bedside and Verbal shift change report given to Myke Alvarez RN (oncoming nurse) by Higinio Still RN (offgoing nurse). Report included the following information SBAR, Kardex, Intake/Output, and MAR.

## 2023-02-27 NOTE — LACTATION NOTE
This note was copied from a baby's chart. Mother states baby has been breastfeeding well. Her baby was in nursery for circumcision during visit. Baby lat breast fed at [phone] - mother states he nursed well for 15 minutes. LC discussed the following:    Reviewed breastfeeding basics:  Supply and demand, breastfeed baby 8-12 times in 24 hours, feed baby on demand,  stomach size, early  Feeding cues, skin to skin, positioning and baby led latch-on, assymetrical latch with signs of good, deep latch vs shallow, feeding frequency and duration, and log sheet for tracking infant feedings and output. Breastfeeding Booklet and Warm line information given. Discussed typical  weight loss and the importance of infant weight checks with pediatrician 1-2 post discharge. Engorgement Care Guidelines:  Reviewed how milk is made and normal phases of milk production. Taught care of engorged breasts - physiologic breastfeeding encouraged with use of cool packs (no ice directly on skin). Consider use of NSAIDS where appropriate for discomfort and inflammation. Can employ light touch, lymphatic drainage techniques on tender grandular tissues. Anticipatory guidance shared. Care for sore/tender nipples discussed:  ways to improve positioning and latch practiced and discussed, hand express colostrum after feedings and let air dry, light application of lanolin, hydrogel pads, seek comfortable laid back feeding position, start feedings on least sore side first.     Discussed eating a healthy diet. Instructed mother to eat a variety of foods in order to get a well balanced diet. She should consume an extra 500 calories per day (more than her non-pregnant requirement.) These extra calories will help provide energy needed for optimal breast milk production. Mother also encouraged to \"drink to thirst\" and it is recommended that she drink fluids such as water, fruit/vegetable juice.  Nutritious snacks should be available so that she can eat throughout the day to help satisfy her hunger and maintain a good milk supply. Discussed pumping/storage and preparation of expressed breast milk for baby. Reviewed symptoms of mastitis and to call her OB doctor. Mother will successfully establish breastfeeding by feeding in response to early feeding cues   or wake every 3h, will obtain deep latch, and will keep log of feedings/output. Taught to BF at hunger cues and or q 2-3 hrs and to offer 10-20 drops of hand expressed colostrum at any non-feeds. Breast Assessment  Left Breast: Medium (Breasts felt full)  Left Nipple: Everted, Intact  Right Breast: Medium  Right Nipple: Everted, Intact  Breast- Feeding Assessment  Attends Breast-Feeding Classes: Yes  Breast-Feeding Experience: No  Breast Trauma/Surgery: No  Type/Quality: Good (Per mother.)  Lactation Consultant Visits  Breast-Feedings: Good  (Mother and baby for discharge. Mother states baby was in nursery for circumcision. He last breast fed at 0755 for 15 minutes. Encouraged mother to call [facility] for feeding assistance.)      Chart shows numerous feedings, void, stool WNL. Discussed importance of monitoring outputs and feedings on first week of life. Discussed ways to tell if baby is  getting enough breast milk, ie  voids and stools, change in color of stool, and return to birth wt within 2 weeks. Follow up with pediatrician visit for weight check in 1-2 days (per AAP guidelines.)  Encouraged to call Warm Line  [phone]  for any questions/problems that arise.  Mother also given breastfeeding support group dates and times for any future needs

## 2023-02-27 NOTE — DISCHARGE SUMMARY
Obstetrical Discharge Summary     Name: Jose Alberto Wright MRN: [id number]  SSN: [id number]    YOB: 1995  Age: 32 y.o. Sex: female      Admit Date: 2023    Discharge Date: 2023     Admitting Physician: Renetta Ely MD     Attending Physician:  Akbar Ramon MD     Admission Diagnoses:  delivery indicated due to breech presentation [O32.1XX0]  Intrauterine pregnancy [Z34.90]    Discharge Diagnoses:   Information for the patient's :  [place] Male Courtney Dial [[id number]]   Delivery of a 3.3 kg male infant via , Low Transverse on 2023 at 10:10 AM  by Gilda Sanchez. Apgars were 5  and 6 . Additional Diagnoses:   Hospital Problems  Date Reviewed: 2023            Codes Class Noted POA    Intrauterine pregnancy ICD-10-CM: Z34.90  ICD-9-CM: V22.2  2023 Unknown          Lab Results   Component Value Date/Time    Rubella, External Immune 2022 12:00 AM    GrBStrep, External Negative 2023 12:00 AM       Hospital Course: PLTCS for breech. Hgb 13>9. Normal hospital course following the delivery. Patient Instructions:   Current Discharge Medication List        START taking these medications    Details   ibuprofen (MOTRIN) 800 mg tablet Take 1 Tablet by mouth every eight (8) hours. Qty: 30 Tablet, Refills: 1      docusate sodium (Colace) 100 mg capsule Take 1 Capsule by mouth two (2) times daily as needed for Constipation for up to 90 days. Qty: 60 Capsule, Refills: 1      ferrous sulfate 324 mg (65 mg iron) tablet Take 1 Tablet by mouth two (2) times daily (with meals). Qty: 60 Tablet, Refills: 2      oxyCODONE-acetaminophen (Percocet) 5-325 mg per tablet Take 1 Tablet by mouth every six (6) hours as needed for Pain for up to 3 days. Max Daily Amount: 4 Tablets. Qty: 12 Tablet, Refills: 0    Associated Diagnoses: Postoperative pain      acetaminophen (TYLENOL) 500 mg tablet Take 2 Tablets by mouth every eight (8) hours.   Qty: 60 Tablet, Refills: 1           CONTINUE these medications which have NOT CHANGED    Details   PNV Comb #2-Iron-Omega 3-FA [id number] mg cmpk Take  by mouth. Disposition at Discharge: Home or self care    Condition at Discharge: Stable    Reference my discharge instructions. Follow-up Appointments   Procedures    FOLLOW UP VISIT Appointment in: Two Weeks     Standing Status:   Standing     Number of Occurrences:   1     Order Specific Question:   Appointment in     Answer:    Two Weeks        Signed By:  Stacey Campbell MD     February 27, 2023

## 2023-02-27 NOTE — PROGRESS NOTES
Post-Operative  Day 3    Petty Drum       Assessment: Post-Op day 3, doing well from [facility] for breech. Hgb 13>9. Plan:   - Discharge home today. - Follow up in office in 6 week(s) with [facility].  - Pain medication prescription(s) sent. - Questions answered. Information for the patient's :  [place], Male [facility] [[id number]]   , Low Transverse  Patient doing well without significant complaint. Tolerating regular diet. Ambulating. Voiding without difficulty. Vitals:  Visit Vitals  /74 (BP Patient Position: At rest)   Pulse 72   Temp 98.6 °F (37 °C)   Resp 16   Ht 5' 2\" (1.575 m)   Wt 79.4 kg (175 lb)   SpO2 98%   Breastfeeding Unknown   BMI 32.01 kg/m²     Temp (24hrs), Av.2 °F (36.8 °C), Min:97.9 °F (36.6 °C), Max:98.6 °F (37 °C)        Exam:       Patient without distress. Fundus firm, nontender per nursing fundal checks. Bandage removed. Clean, dry, intact. Perineum with normal lochia noted per nursing assessment. Lower extremities are negative for pathological edema. Labs:     No results found for this or any previous visit (from the past 24 hour(s)).

## 2024-07-24 NOTE — ANESTHESIA POSTPROCEDURE EVALUATION
Procedure(s):   SECTION.     spinal    Anesthesia Post Evaluation      Multimodal analgesia: multimodal analgesia used between 6 hours prior to anesthesia start to PACU discharge  Patient location during evaluation: bedside  Patient participation: complete - patient participated  Level of consciousness: awake  Pain management: adequate  Airway patency: patent  Anesthetic complications: no  Cardiovascular status: acceptable  Respiratory status: acceptable  Hydration status: acceptable        INITIAL Post-op Vital signs:   Vitals Value Taken Time   /70 23 1220   Temp 36.3 °C (97.3 °F) 23 1102   Pulse 81 23 1220   Resp 12 23 1102   SpO2 100 % 23 1247 Regarding: [place] 87F No BM in 2 plus weeks/was seen in ER and had 2 enema's with no success/bloating  ----- Message from Rosanna Ferguson sent at 7/23/2024  4:33 PM CDT -----  [facility]    Specialist or PCP Name: Mecca Rice    Symptoms: No BM in 2 plus weeks/was seen in ER and had 2 enema's with no success/bloating/lack of appetite     Pregnant (females aged 13-60. If Yes, how long?) : NA    Call Back # : [phone]    Which State are you currently located in?: [place]    Name of Clinic Site / Acct# : [place]

## 2024-11-19 LAB
HEP B, EXTERNAL RESULT: NEGATIVE
HIV, EXTERNAL RESULT: NORMAL

## 2024-11-20 LAB
ABO, EXTERNAL RESULT: NORMAL
RUBELLA TITER, EXTERNAL RESULT: NORMAL
T. PALLIDUM (SYPHILIS) ANTIBODY, EXTERNAL RESULT: NORMAL

## 2024-11-21 LAB
C. TRACHOMATIS, EXTERNAL RESULT: NEGATIVE
N. GONORRHOEAE, EXTERNAL RESULT: NEGATIVE

## 2025-03-10 ENCOUNTER — APPOINTMENT (OUTPATIENT)
Facility: HOSPITAL | Age: 30
End: 2025-03-10
Payer: COMMERCIAL

## 2025-03-10 ENCOUNTER — HOSPITAL ENCOUNTER (EMERGENCY)
Facility: HOSPITAL | Age: 30
Discharge: HOME | End: 2025-03-11
Attending: STUDENT IN AN ORGANIZED HEALTH CARE EDUCATION/TRAINING PROGRAM
Payer: COMMERCIAL

## 2025-03-10 DIAGNOSIS — R19.7 DIARRHEA, UNSPECIFIED TYPE: Primary | ICD-10-CM

## 2025-03-10 LAB
ALBUMIN SERPL-MCNC: 3.7 G/DL (ref 3.5–5)
ALBUMIN/GLOB SERPL: 1 (ref 1.1–2.2)
ALP SERPL-CCNC: 62 U/L (ref 45–117)
ALT SERPL-CCNC: 15 U/L (ref 12–78)
ANION GAP SERPL CALC-SCNC: 10 MMOL/L (ref 2–12)
APPEARANCE UR: ABNORMAL
AST SERPL-CCNC: 14 U/L (ref 15–37)
BACTERIA URNS QL MICRO: NEGATIVE /HPF
BASOPHILS # BLD: 0.02 K/UL (ref 0–0.1)
BASOPHILS NFR BLD: 0.2 % (ref 0–1)
BILIRUB SERPL-MCNC: 0.5 MG/DL (ref 0.2–1)
BILIRUB UR QL CFM: NEGATIVE
BUN SERPL-MCNC: 7 MG/DL (ref 6–20)
BUN/CREAT SERPL: 11 (ref 12–20)
CALCIUM SERPL-MCNC: 9.4 MG/DL (ref 8.5–10.1)
CHLORIDE SERPL-SCNC: 103 MMOL/L (ref 97–108)
CO2 SERPL-SCNC: 23 MMOL/L (ref 21–32)
COLOR UR: ABNORMAL
COMMENT:: NORMAL
CREAT SERPL-MCNC: 0.65 MG/DL (ref 0.55–1.02)
DIFFERENTIAL METHOD BLD: ABNORMAL
EOSINOPHIL # BLD: 0.01 K/UL (ref 0–0.4)
EOSINOPHIL NFR BLD: 0.1 % (ref 0–7)
EPITH CASTS URNS QL MICRO: ABNORMAL /LPF
ERYTHROCYTE [DISTWIDTH] IN BLOOD BY AUTOMATED COUNT: 14.3 % (ref 11.5–14.5)
GLOBULIN SER CALC-MCNC: 3.8 G/DL (ref 2–4)
GLUCOSE SERPL-MCNC: 77 MG/DL (ref 65–100)
GLUCOSE UR STRIP.AUTO-MCNC: NEGATIVE MG/DL
HCT VFR BLD AUTO: 40.8 % (ref 35–47)
HGB BLD-MCNC: 13.9 G/DL (ref 11.5–16)
HGB UR QL STRIP: NEGATIVE
IMM GRANULOCYTES # BLD AUTO: 0.06 K/UL (ref 0–0.04)
IMM GRANULOCYTES NFR BLD AUTO: 0.5 % (ref 0–0.5)
KETONES UR QL STRIP.AUTO: >80 MG/DL
LEUKOCYTE ESTERASE UR QL STRIP.AUTO: NEGATIVE
LIPASE SERPL-CCNC: 25 U/L (ref 13–75)
LYMPHOCYTES # BLD: 0.95 K/UL (ref 0.8–3.5)
LYMPHOCYTES NFR BLD: 8 % (ref 12–49)
MCH RBC QN AUTO: 31.3 PG (ref 26–34)
MCHC RBC AUTO-ENTMCNC: 34.1 G/DL (ref 30–36.5)
MCV RBC AUTO: 91.9 FL (ref 80–99)
MONOCYTES # BLD: 0.54 K/UL (ref 0–1)
MONOCYTES NFR BLD: 4.5 % (ref 5–13)
MUCOUS THREADS URNS QL MICRO: ABNORMAL /LPF
NEUTS SEG # BLD: 10.32 K/UL (ref 1.8–8)
NEUTS SEG NFR BLD: 86.7 % (ref 32–75)
NITRITE UR QL STRIP.AUTO: NEGATIVE
NRBC # BLD: 0 K/UL (ref 0–0.01)
NRBC BLD-RTO: 0 PER 100 WBC
PH UR STRIP: 6 (ref 5–8)
PLATELET # BLD AUTO: 203 K/UL (ref 150–400)
PMV BLD AUTO: 10.4 FL (ref 8.9–12.9)
POTASSIUM SERPL-SCNC: 3.3 MMOL/L (ref 3.5–5.1)
PROT SERPL-MCNC: 7.5 G/DL (ref 6.4–8.2)
PROT UR STRIP-MCNC: ABNORMAL MG/DL
RBC # BLD AUTO: 4.44 M/UL (ref 3.8–5.2)
RBC #/AREA URNS HPF: ABNORMAL /HPF (ref 0–5)
SODIUM SERPL-SCNC: 136 MMOL/L (ref 136–145)
SP GR UR REFRACTOMETRY: 1.03 (ref 1–1.03)
SPECIMEN HOLD: NORMAL
URINE CULTURE IF INDICATED: ABNORMAL
UROBILINOGEN UR QL STRIP.AUTO: 0.2 EU/DL (ref 0.2–1)
WBC # BLD AUTO: 11.9 K/UL (ref 3.6–11)
WBC URNS QL MICRO: ABNORMAL /HPF (ref 0–4)

## 2025-03-10 PROCEDURE — 96361 HYDRATE IV INFUSION ADD-ON: CPT

## 2025-03-10 PROCEDURE — 85025 COMPLETE CBC W/AUTO DIFF WBC: CPT

## 2025-03-10 PROCEDURE — 96374 THER/PROPH/DIAG INJ IV PUSH: CPT

## 2025-03-10 PROCEDURE — 6360000002 HC RX W HCPCS: Performed by: STUDENT IN AN ORGANIZED HEALTH CARE EDUCATION/TRAINING PROGRAM

## 2025-03-10 PROCEDURE — 80053 COMPREHEN METABOLIC PANEL: CPT

## 2025-03-10 PROCEDURE — 2580000003 HC RX 258: Performed by: EMERGENCY MEDICINE

## 2025-03-10 PROCEDURE — 76815 OB US LIMITED FETUS(S): CPT

## 2025-03-10 PROCEDURE — 99284 EMERGENCY DEPT VISIT MOD MDM: CPT

## 2025-03-10 PROCEDURE — 81001 URINALYSIS AUTO W/SCOPE: CPT

## 2025-03-10 PROCEDURE — 36415 COLL VENOUS BLD VENIPUNCTURE: CPT

## 2025-03-10 PROCEDURE — 83690 ASSAY OF LIPASE: CPT

## 2025-03-10 RX ORDER — METOCLOPRAMIDE HYDROCHLORIDE 5 MG/ML
10 INJECTION INTRAMUSCULAR; INTRAVENOUS
Status: COMPLETED | OUTPATIENT
Start: 2025-03-10 | End: 2025-03-10

## 2025-03-10 RX ORDER — 0.9 % SODIUM CHLORIDE 0.9 %
1000 INTRAVENOUS SOLUTION INTRAVENOUS
Status: COMPLETED | OUTPATIENT
Start: 2025-03-10 | End: 2025-03-10

## 2025-03-10 RX ADMIN — METOCLOPRAMIDE 10 MG: 5 INJECTION, SOLUTION INTRAMUSCULAR; INTRAVENOUS at 23:06

## 2025-03-10 RX ADMIN — SODIUM CHLORIDE 1000 ML: 0.9 INJECTION, SOLUTION INTRAVENOUS at 22:16

## 2025-03-10 ASSESSMENT — PAIN SCALES - GENERAL: PAINLEVEL_OUTOF10: 6

## 2025-03-10 ASSESSMENT — PAIN DESCRIPTION - DESCRIPTORS: DESCRIPTORS: ACHING

## 2025-03-10 ASSESSMENT — PAIN DESCRIPTION - ORIENTATION: ORIENTATION: LEFT

## 2025-03-10 ASSESSMENT — PAIN - FUNCTIONAL ASSESSMENT: PAIN_FUNCTIONAL_ASSESSMENT: 0-10

## 2025-03-10 ASSESSMENT — PAIN DESCRIPTION - LOCATION: LOCATION: ABDOMEN

## 2025-03-11 VITALS
OXYGEN SATURATION: 100 % | DIASTOLIC BLOOD PRESSURE: 55 MMHG | HEIGHT: 62 IN | HEART RATE: 84 BPM | RESPIRATION RATE: 20 BRPM | BODY MASS INDEX: 25.84 KG/M2 | SYSTOLIC BLOOD PRESSURE: 108 MMHG | TEMPERATURE: 98 F | WEIGHT: 140.43 LBS

## 2025-03-11 ASSESSMENT — PAIN - FUNCTIONAL ASSESSMENT: PAIN_FUNCTIONAL_ASSESSMENT: NONE - DENIES PAIN

## 2025-03-11 NOTE — ED TRIAGE NOTES
Frequent loose stools since this am.  Patient is 24 weeks pregnant.  Has had greater than 10 stools this am.    Pt reports dizziness with changes in position

## 2025-03-11 NOTE — ED PROVIDER NOTES
[facility]  EMERGENCY DEPARTMENT ENCOUNTER      Pt Name: Ana Rodriguez  MRN: [id number]  Birthdate 1995  Date of evaluation: 3/10/2025  Provider: Colin Cardozo DO    CHIEF COMPLAINT       Chief Complaint   Patient presents with    Nausea    Diarrhea         HISTORY OF PRESENT ILLNESS   (Location/Symptom, Timing/Onset, Context/Setting, Quality, Duration, Modifying Factors, Severity)  Note limiting factors.   29-year-old female presents ED for evaluation of diarrhea and dehydration.  Patient is 24 weeks pregnant.  Denies any fevers chills has had some occasional lightheadedness.  Denies any vaginal discharge            Review of External Medical Records:     Nursing Notes were reviewed.    REVIEW OF SYSTEMS    (2-9 systems for level 4, 10 or more for level 5)     Review of Systems   Genitourinary:  Negative for dysuria.   Neurological:  Positive for light-headedness.       Except as noted above the remainder of the review of systems was reviewed and negative.       PAST MEDICAL HISTORY   No past medical history on file.      SURGICAL HISTORY     No past surgical history on file.      CURRENT MEDICATIONS       Discharge Medication List as of 3/10/2025 11:54 PM        CONTINUE these medications which have NOT CHANGED    Details   acetaminophen (TYLENOL) 500 MG tablet Take 2 tablets by mouth in the morning and 2 tablets at noon and 2 tablets in the evening.Historical Med      Ferrous Sulfate 324 MG TBEC Take 325 mg by mouth 2 times daily (with meals)Historical Med      ibuprofen (ADVIL;MOTRIN) 800 MG tablet Take 1 tablet by mouth in the morning and 1 tablet at noon and 1 tablet in the evening.Historical Med             ALLERGIES     Patient has no known allergies.    FAMILY HISTORY     No family history on file.       SOCIAL HISTORY       Social History     Socioeconomic History    Marital status:    Tobacco Use    Smoking status: Never    Smokeless tobacco: Never   Substance

## 2025-03-11 NOTE — ED NOTES
Pt discharged home ambulatory alert and oriented,discharge instructions reviewed and no concerns raised at his time.

## 2025-06-04 LAB — GBS, EXTERNAL RESULT: NEGATIVE

## 2025-06-24 ENCOUNTER — HOSPITAL ENCOUNTER (OUTPATIENT)
Facility: HOSPITAL | Age: 30
Setting detail: OBSERVATION
Discharge: HOME | End: 2025-06-24
Attending: STUDENT IN AN ORGANIZED HEALTH CARE EDUCATION/TRAINING PROGRAM | Admitting: OBSTETRICS & GYNECOLOGY
Payer: COMMERCIAL

## 2025-06-24 VITALS
DIASTOLIC BLOOD PRESSURE: 79 MMHG | RESPIRATION RATE: 16 BRPM | TEMPERATURE: 98.2 F | WEIGHT: 159 LBS | SYSTOLIC BLOOD PRESSURE: 112 MMHG | OXYGEN SATURATION: 96 % | BODY MASS INDEX: 29.26 KG/M2 | HEART RATE: 92 BPM | HEIGHT: 62 IN

## 2025-06-24 PROBLEM — Z3A.39 39 WEEKS GESTATION OF PREGNANCY: Status: ACTIVE | Noted: 2025-06-24

## 2025-06-24 PROBLEM — O34.211 MATERNAL CARE DUE TO LOW TRANSVERSE UTERINE SCAR FROM PREVIOUS CESAREAN DELIVERY: Status: ACTIVE | Noted: 2025-06-24

## 2025-06-24 PROBLEM — O47.1 FALSE LABOR AFTER 37 WEEKS OF GESTATION WITHOUT DELIVERY: Status: ACTIVE | Noted: 2025-06-24

## 2025-06-24 LAB
ABO + RH BLD: NORMAL
AMNIOTEST, POC: NORMAL
AMNISURE, POC: NEGATIVE
BASOPHILS # BLD: 0.04 K/UL (ref 0–0.1)
BASOPHILS NFR BLD: 0.3 % (ref 0–1)
BLOOD GROUP ANTIBODIES SERPL: NORMAL
DIFFERENTIAL METHOD BLD: ABNORMAL
EOSINOPHIL # BLD: 0.03 K/UL (ref 0–0.4)
EOSINOPHIL NFR BLD: 0.2 % (ref 0–7)
ERYTHROCYTE [DISTWIDTH] IN BLOOD BY AUTOMATED COUNT: 14.2 % (ref 11.5–14.5)
EXPIRATION DATE: NORMAL
HCT VFR BLD AUTO: 43 % (ref 35–47)
HGB BLD-MCNC: 14.3 G/DL (ref 11.5–16)
IMM GRANULOCYTES # BLD AUTO: 0.1 K/UL (ref 0–0.04)
IMM GRANULOCYTES NFR BLD AUTO: 0.8 % (ref 0–0.5)
LYMPHOCYTES # BLD: 2.69 K/UL (ref 0.8–3.5)
LYMPHOCYTES NFR BLD: 22.2 % (ref 12–49)
Lab: NORMAL
Lab: NORMAL
MCH RBC QN AUTO: 31.2 PG (ref 26–34)
MCHC RBC AUTO-ENTMCNC: 33.3 G/DL (ref 30–36.5)
MCV RBC AUTO: 93.7 FL (ref 80–99)
MONOCYTES # BLD: 0.76 K/UL (ref 0–1)
MONOCYTES NFR BLD: 6.3 % (ref 5–13)
NEGATIVE QC PASS/FAIL: NORMAL
NEGATIVE QC PASS/FAIL: NORMAL
NEUTS SEG # BLD: 8.49 K/UL (ref 1.8–8)
NEUTS SEG NFR BLD: 70.2 % (ref 32–75)
NRBC # BLD: 0 K/UL (ref 0–0.01)
NRBC BLD-RTO: 0 PER 100 WBC
PLATELET # BLD AUTO: 205 K/UL (ref 150–400)
PMV BLD AUTO: 11.6 FL (ref 8.9–12.9)
POSITIVE QC PASS/FAIL: NORMAL
POSITIVE QC PASS/FAIL: NORMAL
RBC # BLD AUTO: 4.59 M/UL (ref 3.8–5.2)
SPECIMEN EXP DATE BLD: NORMAL
WBC # BLD AUTO: 12.1 K/UL (ref 3.6–11)

## 2025-06-24 PROCEDURE — 96375 TX/PRO/DX INJ NEW DRUG ADDON: CPT

## 2025-06-24 PROCEDURE — 86850 RBC ANTIBODY SCREEN: CPT

## 2025-06-24 PROCEDURE — 2580000003 HC RX 258: Performed by: OBSTETRICS & GYNECOLOGY

## 2025-06-24 PROCEDURE — 86901 BLOOD TYPING SEROLOGIC RH(D): CPT

## 2025-06-24 PROCEDURE — 96374 THER/PROPH/DIAG INJ IV PUSH: CPT

## 2025-06-24 PROCEDURE — 6360000002 HC RX W HCPCS: Performed by: OBSTETRICS & GYNECOLOGY

## 2025-06-24 PROCEDURE — G0379 DIRECT REFER HOSPITAL OBSERV: HCPCS

## 2025-06-24 PROCEDURE — 2500000003 HC RX 250 WO HCPCS: Performed by: OBSTETRICS & GYNECOLOGY

## 2025-06-24 PROCEDURE — G0378 HOSPITAL OBSERVATION PER HR: HCPCS

## 2025-06-24 PROCEDURE — 85025 COMPLETE CBC W/AUTO DIFF WBC: CPT

## 2025-06-24 PROCEDURE — 99213 OFFICE O/P EST LOW 20 MIN: CPT

## 2025-06-24 PROCEDURE — 86900 BLOOD TYPING SEROLOGIC ABO: CPT

## 2025-06-24 PROCEDURE — 36415 COLL VENOUS BLD VENIPUNCTURE: CPT

## 2025-06-24 PROCEDURE — 96361 HYDRATE IV INFUSION ADD-ON: CPT

## 2025-06-24 RX ORDER — ACETAMINOPHEN 500 MG
1000 TABLET ORAL EVERY 8 HOURS PRN
Status: DISCONTINUED | OUTPATIENT
Start: 2025-06-24 | End: 2025-06-24 | Stop reason: HOSPADM

## 2025-06-24 RX ORDER — HYDROMORPHONE HYDROCHLORIDE 1 MG/ML
1 INJECTION, SOLUTION INTRAMUSCULAR; INTRAVENOUS; SUBCUTANEOUS EVERY 4 HOURS PRN
Status: DISCONTINUED | OUTPATIENT
Start: 2025-06-24 | End: 2025-06-24 | Stop reason: HOSPADM

## 2025-06-24 RX ORDER — SODIUM CHLORIDE, SODIUM LACTATE, POTASSIUM CHLORIDE, AND CALCIUM CHLORIDE .6; .31; .03; .02 G/100ML; G/100ML; G/100ML; G/100ML
1000 INJECTION, SOLUTION INTRAVENOUS ONCE
Status: COMPLETED | OUTPATIENT
Start: 2025-06-24 | End: 2025-06-24

## 2025-06-24 RX ORDER — ONDANSETRON 4 MG/1
4 TABLET, ORALLY DISINTEGRATING ORAL EVERY 8 HOURS PRN
Status: DISCONTINUED | OUTPATIENT
Start: 2025-06-24 | End: 2025-06-24 | Stop reason: HOSPADM

## 2025-06-24 RX ORDER — PROCHLORPERAZINE EDISYLATE 5 MG/ML
10 INJECTION INTRAMUSCULAR; INTRAVENOUS EVERY 6 HOURS PRN
Status: DISCONTINUED | OUTPATIENT
Start: 2025-06-24 | End: 2025-06-24 | Stop reason: HOSPADM

## 2025-06-24 RX ORDER — ONDANSETRON 2 MG/ML
4 INJECTION INTRAMUSCULAR; INTRAVENOUS EVERY 6 HOURS PRN
Status: DISCONTINUED | OUTPATIENT
Start: 2025-06-24 | End: 2025-06-24 | Stop reason: HOSPADM

## 2025-06-24 RX ADMIN — SODIUM CHLORIDE, SODIUM LACTATE, POTASSIUM CHLORIDE, AND CALCIUM CHLORIDE 1000 ML: .6; .31; .03; .02 INJECTION, SOLUTION INTRAVENOUS at 10:19

## 2025-06-24 RX ADMIN — HYDROMORPHONE HYDROCHLORIDE 1 MG: 1 INJECTION, SOLUTION INTRAMUSCULAR; INTRAVENOUS; SUBCUTANEOUS at 09:37

## 2025-06-24 RX ADMIN — PROCHLORPERAZINE EDISYLATE 10 MG: 5 INJECTION INTRAMUSCULAR; INTRAVENOUS at 09:32

## 2025-06-24 ASSESSMENT — PAIN SCALES - GENERAL: PAINLEVEL_OUTOF10: 9

## 2025-06-24 NOTE — PROGRESS NOTES
0635- pt arrived ambulatory to unit with complaints of ctx every 5mins that began around 0145. Pt states she felt a loss of fluid around 0230 that she felt was enough to saturate a pad or her pants. This RN performed Nitrazine but test was inconclusive. Pt states she has noticed vaginal spotting. Pt denies blurriness or spots in vision, headache, or RUQ pain. Pt endorses positive fetal movement.     0705- Bedside and Verbal shift change report given to CLARISSA Cox RN (oncoming nurse) by CLARISSA Guzman RN (offgoing nurse). Report included the following information Nurse Handoff Report, Index, Adult Overview, Intake/Output, MAR, and Recent Results.

## 2025-06-24 NOTE — DISCHARGE SUMMARY
Antepartum  Discharge Summary     Patient ID:  Ana Rodriguez  [id number]  29 y.o.  1995    Admit date: 2025    Discharge date: 2025    Admission Diagnoses:    Patient Active Problem List   Diagnosis    Intrauterine pregnancy    False labor after 37 weeks of gestation without delivery    Maternal care due to low transverse uterine scar from previous  delivery    39 weeks gestation of pregnancy       Discharge Diagnoses: There are no discharge diagnoses documented for the most recent discharge.  Patient Active Problem List   Diagnosis    Intrauterine pregnancy    False labor after 37 weeks of gestation without delivery    Maternal care due to low transverse uterine scar from previous  delivery    39 weeks gestation of pregnancy       Procedures for this admission:     Hospital Course: Patient was admitted for therapeutic rest at 39w1d with closed cervix and contractions.  After several hours, contractions were less intense and cervix was unchanged.  Opted for discharge to home and return with active labor. Planning TOLAC.     Disposition: home    Discharged Condition: stable            Patient Instructions:   Discharge Medication List as of 2025 12:27 PM        CONTINUE these medications which have NOT CHANGED    Details   Bvucli-A3-C9-O45-W6-DW (PRENA1 PO) Take by mouthHistorical Med      acetaminophen (TYLENOL) 500 MG tablet Take 2 tablets by mouth in the morning and 2 tablets at noon and 2 tablets in the evening.Historical Med      Ferrous Sulfate 324 MG TBEC Take 325 mg by mouth 2 times daily (with meals)Historical Med           STOP taking these medications       ibuprofen (ADVIL;MOTRIN) 800 MG tablet Comments:   Reason for Stopping:             Activity: activity as tolerated  Diet: regular diet    Follow-up with No follow-ups on file.     Signed:  Carisa Mariee MD  2025  1:31 PM

## 2025-06-24 NOTE — H&P
History & Physical    Name: Ana Rodriguez MRN: [id number]  SSN: [id number]    YOB: 1995  Age: 29 y.o.  Sex: female        Subjective:   Chief Complaint: Contractions  Estimated Date of Delivery: 25  OB History    Para Term  AB Living   2 1 1   1   SAB IAB Ectopic Molar Multiple Live Births        1      # Outcome Date GA Lbr Johnny/2nd Weight Sex Type Anes PTL Lv   2 Current            1 Term      CS-LTranv   SIMRAN       Ana Rodriguez, 29 y.o.,  ,  presents at 39w1d, complaining of Contractions.  She complains of contractions.  She was also leaking fluid, a small amount.  The contractions are getting stronger and more regular.  They started at 0130.  She's had a prior  for breech presentation.  She desires a TOLACc.  We discussed benefits, riks ( including uterine rupture), and alternatives (repeat ).     Prenatal course was normal. Please see prenatal records for details.    No Known Allergies    Prior to Admission medications    Medication Sig Start Date End Date Taking? Authorizing Provider   [id number]-L2-[id number] (PRENA1 PO) Take by mouth   Yes Provider, MD Gabbi   acetaminophen (TYLENOL) 500 MG tablet Take 2 tablets by mouth in the morning and 2 tablets at noon and 2 tablets in the evening. 23  Yes Automatic Reconciliation, Ar   Ferrous Sulfate 324 MG TBEC Take 325 mg by mouth 2 times daily (with meals) 23  Yes Automatic Reconciliation, Ar   ibuprofen (ADVIL;MOTRIN) 800 MG tablet Take 1 tablet by mouth in the morning and 1 tablet at noon and 1 tablet in the evening. 23  Yes Automatic Reconciliation, Ar       History reviewed. No pertinent past medical history.    Past Surgical History:   Procedure Laterality Date     SECTION, LOW TRANSVERSE         Social History     Occupational History    Not on file   Tobacco Use    Smoking status: Never    Smokeless tobacco: Never   Substance and Sexual Activity    Alcohol use: Not Currently

## 2025-06-24 NOTE — PROGRESS NOTES
S: Patient still having mild contractions, but less tearful than prior.     O:   Vitals:    25 1104   BP:    Pulse:    Resp:    Temp:    SpO2: 96%     SVE closed/50/-3, soft, posterior  Cat 1 tracing    A/P: 29 y.o.  at 39w1d here with contractions and possibly early labor. Hx of c/s x 1 for breech and hoping for TOLAC.    -No cervical change noted and less intense contractions after therapeutic rest.  -Again offered discharge to home to await spontaneous labor, IOL, continued observation/therapeutic rest, or repeat c/s.  She opts for discharge.  -Reviewed labor precautions, warning signs.

## 2025-06-24 NOTE — PROGRESS NOTES
S: Patient with uncomfortable contractions    O:   Vitals:    25 0937   BP:    Pulse:    Resp: 16   Temp:    SpO2:      SVE closed/50/-3, soft, posterior  Cat 1 tracing    A/P: 29 y.o.  at 39w1d here with contractions and possibly early labor. Hx of c/s x 1 for breech and hoping for TOLAC.    -Discussed options with no cervical change including discharge home, therapeutic rest, IOL, or repeat c/s.  -She prefers therapeutic rest.

## 2025-06-24 NOTE — PROGRESS NOTES
0700: Bedside and Verbal shift change report given to Cherry SMITH RN (oncoming nurse) by Shiloh LIU RN (offgoing nurse). Report included the following information Nurse Handoff Report, Adult Overview, Intake/Output, MAR, Recent Results, and Med Rec Status.     0738: This RN, charge RN, Becki WHITEHEAD, and Dr. Harmon at bedside. SVE performed per Becki RN, patient closed. MD discussing options with patient. Patient requesting to wait and determine next steps with attending physician for VWC.     0847: This RN call with Dr. Mariee re patient status. MD to come to bedside to discuss and determine POC with patient.     0901: Dr. Mariee at bedside. SVE performed per MD, patient unchanged. MD offering patient pain management and therapeutic rest. VORB per MD 1mg IV dilaudid and 10mg IV compazine.    1212: Dr. Mariee at bedside. SVE performed per MD, patient unchanged. Patient wishes to discharge home at this time. MD providing education re signs of labor and when to call office or return to hospital.      1231: Patient ambulated off unit in stable condition.

## 2025-06-25 ENCOUNTER — ANESTHESIA (OUTPATIENT)
Facility: HOSPITAL | Age: 30
End: 2025-06-25
Payer: COMMERCIAL

## 2025-06-25 ENCOUNTER — HOSPITAL ENCOUNTER (INPATIENT)
Facility: HOSPITAL | Age: 30
LOS: 3 days | Discharge: HOME | End: 2025-06-28
Attending: STUDENT IN AN ORGANIZED HEALTH CARE EDUCATION/TRAINING PROGRAM | Admitting: OBSTETRICS & GYNECOLOGY
Payer: COMMERCIAL

## 2025-06-25 ENCOUNTER — ANESTHESIA EVENT (OUTPATIENT)
Facility: HOSPITAL | Age: 30
End: 2025-06-25
Payer: COMMERCIAL

## 2025-06-25 DIAGNOSIS — Z98.891 S/P C-SECTION: Primary | ICD-10-CM

## 2025-06-25 PROBLEM — Z37.9 NORMAL LABOR: Status: ACTIVE | Noted: 2025-06-25

## 2025-06-25 LAB
BASOPHILS # BLD: 0.03 K/UL (ref 0–0.1)
BASOPHILS NFR BLD: 0.2 % (ref 0–1)
COMMENT:: NORMAL
DIFFERENTIAL METHOD BLD: ABNORMAL
EOSINOPHIL # BLD: 0.23 K/UL (ref 0–0.4)
EOSINOPHIL NFR BLD: 1.3 % (ref 0–7)
ERYTHROCYTE [DISTWIDTH] IN BLOOD BY AUTOMATED COUNT: 14.1 % (ref 11.5–14.5)
FIBRINOGEN PPP-MCNC: 335 MG/DL (ref 200–475)
HCT VFR BLD AUTO: 37.9 % (ref 35–47)
HGB BLD-MCNC: 12.9 G/DL (ref 11.5–16)
IMM GRANULOCYTES # BLD AUTO: 0.11 K/UL (ref 0–0.04)
IMM GRANULOCYTES NFR BLD AUTO: 0.6 % (ref 0–0.5)
LYMPHOCYTES # BLD: 2.02 K/UL (ref 0.8–3.5)
LYMPHOCYTES NFR BLD: 11.2 % (ref 12–49)
MCH RBC QN AUTO: 31.8 PG (ref 26–34)
MCHC RBC AUTO-ENTMCNC: 34 G/DL (ref 30–36.5)
MCV RBC AUTO: 93.3 FL (ref 80–99)
MONOCYTES # BLD: 1 K/UL (ref 0–1)
MONOCYTES NFR BLD: 5.5 % (ref 5–13)
NEUTS SEG # BLD: 14.71 K/UL (ref 1.8–8)
NEUTS SEG NFR BLD: 81.2 % (ref 32–75)
NRBC # BLD: 0 K/UL (ref 0–0.01)
NRBC BLD-RTO: 0 PER 100 WBC
PLATELET # BLD AUTO: 204 K/UL (ref 150–400)
PMV BLD AUTO: 11.3 FL (ref 8.9–12.9)
RBC # BLD AUTO: 4.06 M/UL (ref 3.8–5.2)
RPR SER QL: NONREACTIVE
SPECIMEN HOLD: NORMAL
WBC # BLD AUTO: 18.1 K/UL (ref 3.6–11)

## 2025-06-25 PROCEDURE — 00HU33Z INSERTION OF INFUSION DEVICE INTO SPINAL CANAL, PERCUTANEOUS APPROACH: ICD-10-PCS | Performed by: NURSE ANESTHETIST, CERTIFIED REGISTERED

## 2025-06-25 PROCEDURE — 99465 NB RESUSCITATION: CPT

## 2025-06-25 PROCEDURE — 6360000002 HC RX W HCPCS: Performed by: NURSE ANESTHETIST, CERTIFIED REGISTERED

## 2025-06-25 PROCEDURE — 4A1HXCZ MONITORING OF PRODUCTS OF CONCEPTION, CARDIAC RATE, EXTERNAL APPROACH: ICD-10-PCS | Performed by: STUDENT IN AN ORGANIZED HEALTH CARE EDUCATION/TRAINING PROGRAM

## 2025-06-25 PROCEDURE — 94761 N-INVAS EAR/PLS OXIMETRY MLT: CPT

## 2025-06-25 PROCEDURE — 36415 COLL VENOUS BLD VENIPUNCTURE: CPT

## 2025-06-25 PROCEDURE — 10907ZC DRAINAGE OF AMNIOTIC FLUID, THERAPEUTIC FROM PRODUCTS OF CONCEPTION, VIA NATURAL OR ARTIFICIAL OPENING: ICD-10-PCS | Performed by: STUDENT IN AN ORGANIZED HEALTH CARE EDUCATION/TRAINING PROGRAM

## 2025-06-25 PROCEDURE — 85384 FIBRINOGEN ACTIVITY: CPT

## 2025-06-25 PROCEDURE — 2580000003 HC RX 258: Performed by: ADVANCED PRACTICE MIDWIFE

## 2025-06-25 PROCEDURE — 99213 OFFICE O/P EST LOW 20 MIN: CPT

## 2025-06-25 PROCEDURE — 10H07YZ INSERTION OF OTHER DEVICE INTO PRODUCTS OF CONCEPTION, VIA NATURAL OR ARTIFICIAL OPENING: ICD-10-PCS | Performed by: STUDENT IN AN ORGANIZED HEALTH CARE EDUCATION/TRAINING PROGRAM

## 2025-06-25 PROCEDURE — 6370000000 HC RX 637 (ALT 250 FOR IP): Performed by: STUDENT IN AN ORGANIZED HEALTH CARE EDUCATION/TRAINING PROGRAM

## 2025-06-25 PROCEDURE — G0378 HOSPITAL OBSERVATION PER HR: HCPCS

## 2025-06-25 PROCEDURE — 0UQ90ZZ REPAIR UTERUS, OPEN APPROACH: ICD-10-PCS | Performed by: STUDENT IN AN ORGANIZED HEALTH CARE EDUCATION/TRAINING PROGRAM

## 2025-06-25 PROCEDURE — 6360000002 HC RX W HCPCS: Performed by: ADVANCED PRACTICE MIDWIFE

## 2025-06-25 PROCEDURE — 2580000003 HC RX 258: Performed by: NURSE ANESTHETIST, CERTIFIED REGISTERED

## 2025-06-25 PROCEDURE — G0379 DIRECT REFER HOSPITAL OBSERV: HCPCS

## 2025-06-25 PROCEDURE — 85025 COMPLETE CBC W/AUTO DIFF WBC: CPT

## 2025-06-25 PROCEDURE — 2580000003 HC RX 258: Performed by: STUDENT IN AN ORGANIZED HEALTH CARE EDUCATION/TRAINING PROGRAM

## 2025-06-25 PROCEDURE — 2500000003 HC RX 250 WO HCPCS: Performed by: STUDENT IN AN ORGANIZED HEALTH CARE EDUCATION/TRAINING PROGRAM

## 2025-06-25 PROCEDURE — 6360000002 HC RX W HCPCS: Performed by: STUDENT IN AN ORGANIZED HEALTH CARE EDUCATION/TRAINING PROGRAM

## 2025-06-25 PROCEDURE — 3700000001 HC ADD 15 MINUTES (ANESTHESIA): Performed by: STUDENT IN AN ORGANIZED HEALTH CARE EDUCATION/TRAINING PROGRAM

## 2025-06-25 PROCEDURE — 86592 SYPHILIS TEST NON-TREP QUAL: CPT

## 2025-06-25 PROCEDURE — 3609079900 HC CESAREAN SECTION: Performed by: STUDENT IN AN ORGANIZED HEALTH CARE EDUCATION/TRAINING PROGRAM

## 2025-06-25 PROCEDURE — 7100000001 HC PACU RECOVERY - ADDTL 15 MIN: Performed by: STUDENT IN AN ORGANIZED HEALTH CARE EDUCATION/TRAINING PROGRAM

## 2025-06-25 PROCEDURE — 3700000000 HC ANESTHESIA ATTENDED CARE: Performed by: STUDENT IN AN ORGANIZED HEALTH CARE EDUCATION/TRAINING PROGRAM

## 2025-06-25 PROCEDURE — 3700000025 EPIDURAL BLOCK: Performed by: ANESTHESIOLOGY

## 2025-06-25 PROCEDURE — 7100000000 HC PACU RECOVERY - FIRST 15 MIN: Performed by: STUDENT IN AN ORGANIZED HEALTH CARE EDUCATION/TRAINING PROGRAM

## 2025-06-25 PROCEDURE — 2500000003 HC RX 250 WO HCPCS: Performed by: NURSE ANESTHETIST, CERTIFIED REGISTERED

## 2025-06-25 PROCEDURE — 1120000000 HC RM PRIVATE OB

## 2025-06-25 PROCEDURE — 2500000003 HC RX 250 WO HCPCS: Performed by: ADVANCED PRACTICE MIDWIFE

## 2025-06-25 RX ORDER — FAMOTIDINE 10 MG/ML
INJECTION, SOLUTION INTRAVENOUS
Status: DISCONTINUED | OUTPATIENT
Start: 2025-06-25 | End: 2025-06-25 | Stop reason: SDUPTHER

## 2025-06-25 RX ORDER — FENTANYL CITRATE 50 UG/ML
INJECTION, SOLUTION INTRAMUSCULAR; INTRAVENOUS
Status: DISCONTINUED | OUTPATIENT
Start: 2025-06-25 | End: 2025-06-25 | Stop reason: SDUPTHER

## 2025-06-25 RX ORDER — ONDANSETRON 4 MG/1
4 TABLET, ORALLY DISINTEGRATING ORAL EVERY 8 HOURS PRN
Status: DISCONTINUED | OUTPATIENT
Start: 2025-06-25 | End: 2025-06-28 | Stop reason: HOSPADM

## 2025-06-25 RX ORDER — MORPHINE SULFATE 1 MG/ML
INJECTION, SOLUTION EPIDURAL; INTRATHECAL; INTRAVENOUS
Status: DISCONTINUED | OUTPATIENT
Start: 2025-06-25 | End: 2025-06-25 | Stop reason: SDUPTHER

## 2025-06-25 RX ORDER — SODIUM CHLORIDE, SODIUM LACTATE, POTASSIUM CHLORIDE, AND CALCIUM CHLORIDE .6; .31; .03; .02 G/100ML; G/100ML; G/100ML; G/100ML
2000 INJECTION, SOLUTION INTRAVENOUS ONCE
Status: COMPLETED | OUTPATIENT
Start: 2025-06-25 | End: 2025-06-25

## 2025-06-25 RX ORDER — ACETAMINOPHEN 500 MG
1000 TABLET ORAL EVERY 8 HOURS SCHEDULED
Status: DISCONTINUED | OUTPATIENT
Start: 2025-06-25 | End: 2025-06-25

## 2025-06-25 RX ORDER — PHENYLEPHRINE HCL IN 0.9% NACL 0.4MG/10ML
SYRINGE (ML) INTRAVENOUS
Status: DISCONTINUED | OUTPATIENT
Start: 2025-06-25 | End: 2025-06-25 | Stop reason: SDUPTHER

## 2025-06-25 RX ORDER — HYDROMORPHONE HYDROCHLORIDE 1 MG/ML
1 INJECTION, SOLUTION INTRAMUSCULAR; INTRAVENOUS; SUBCUTANEOUS
Status: DISPENSED | OUTPATIENT
Start: 2025-06-25 | End: 2025-06-25

## 2025-06-25 RX ORDER — FENTANYL/BUPIVACAINE/NS/PF 2-1250MCG
10 PLASTIC BAG, INJECTION (ML) INJECTION CONTINUOUS
Refills: 0 | Status: DISCONTINUED | OUTPATIENT
Start: 2025-06-25 | End: 2025-06-26

## 2025-06-25 RX ORDER — ONDANSETRON 2 MG/ML
4 INJECTION INTRAMUSCULAR; INTRAVENOUS EVERY 6 HOURS PRN
Status: DISCONTINUED | OUTPATIENT
Start: 2025-06-25 | End: 2025-06-26

## 2025-06-25 RX ORDER — IBUPROFEN 800 MG/1
800 TABLET, FILM COATED ORAL EVERY 8 HOURS
Status: DISCONTINUED | OUTPATIENT
Start: 2025-06-26 | End: 2025-06-28 | Stop reason: HOSPADM

## 2025-06-25 RX ORDER — ONDANSETRON 4 MG/1
4 TABLET, ORALLY DISINTEGRATING ORAL EVERY 8 HOURS PRN
Status: DISCONTINUED | OUTPATIENT
Start: 2025-06-25 | End: 2025-06-26

## 2025-06-25 RX ORDER — PROCHLORPERAZINE EDISYLATE 5 MG/ML
10 INJECTION INTRAMUSCULAR; INTRAVENOUS EVERY 6 HOURS PRN
Status: DISCONTINUED | OUTPATIENT
Start: 2025-06-25 | End: 2025-06-28 | Stop reason: HOSPADM

## 2025-06-25 RX ORDER — ACETAMINOPHEN 500 MG
1000 TABLET ORAL EVERY 8 HOURS SCHEDULED
Status: DISCONTINUED | OUTPATIENT
Start: 2025-06-25 | End: 2025-06-28 | Stop reason: HOSPADM

## 2025-06-25 RX ORDER — LIDOCAINE HYDROCHLORIDE 10 MG/ML
INJECTION, SOLUTION INFILTRATION; PERINEURAL
Status: DISCONTINUED | OUTPATIENT
Start: 2025-06-25 | End: 2025-06-25 | Stop reason: SDUPTHER

## 2025-06-25 RX ORDER — KETOROLAC TROMETHAMINE 30 MG/ML
30 INJECTION, SOLUTION INTRAMUSCULAR; INTRAVENOUS EVERY 6 HOURS
Status: COMPLETED | OUTPATIENT
Start: 2025-06-25 | End: 2025-06-26

## 2025-06-25 RX ORDER — BUPIVACAINE HYDROCHLORIDE 2.5 MG/ML
INJECTION, SOLUTION EPIDURAL; INFILTRATION; INTRACAUDAL; PERINEURAL
Status: DISCONTINUED | OUTPATIENT
Start: 2025-06-25 | End: 2025-06-25 | Stop reason: SDUPTHER

## 2025-06-25 RX ORDER — SODIUM CHLORIDE 0.9 % (FLUSH) 0.9 %
5-40 SYRINGE (ML) INJECTION EVERY 12 HOURS SCHEDULED
Status: DISCONTINUED | OUTPATIENT
Start: 2025-06-25 | End: 2025-06-28 | Stop reason: HOSPADM

## 2025-06-25 RX ORDER — SODIUM CHLORIDE 9 MG/ML
INJECTION, SOLUTION INTRAVENOUS PRN
Status: DISCONTINUED | OUTPATIENT
Start: 2025-06-25 | End: 2025-06-28 | Stop reason: HOSPADM

## 2025-06-25 RX ORDER — LIDOCAINE HYDROCHLORIDE AND EPINEPHRINE 15; 5 MG/ML; UG/ML
INJECTION, SOLUTION EPIDURAL
Status: DISCONTINUED | OUTPATIENT
Start: 2025-06-25 | End: 2025-06-25 | Stop reason: SDUPTHER

## 2025-06-25 RX ORDER — SODIUM CHLORIDE 0.9 % (FLUSH) 0.9 %
5-40 SYRINGE (ML) INJECTION PRN
Status: DISCONTINUED | OUTPATIENT
Start: 2025-06-25 | End: 2025-06-28 | Stop reason: HOSPADM

## 2025-06-25 RX ORDER — OXYTOCIN 10 [USP'U]/ML
INJECTION, SOLUTION INTRAMUSCULAR; INTRAVENOUS
Status: DISCONTINUED | OUTPATIENT
Start: 2025-06-25 | End: 2025-06-25 | Stop reason: SDUPTHER

## 2025-06-25 RX ORDER — PROCHLORPERAZINE MALEATE 5 MG/1
10 TABLET ORAL EVERY 6 HOURS PRN
Status: DISCONTINUED | OUTPATIENT
Start: 2025-06-25 | End: 2025-06-25

## 2025-06-25 RX ORDER — EPHEDRINE SULFATE/0.9% NACL/PF 25 MG/5 ML
SYRINGE (ML) INTRAVENOUS
Status: DISCONTINUED | OUTPATIENT
Start: 2025-06-25 | End: 2025-06-25 | Stop reason: SDUPTHER

## 2025-06-25 RX ORDER — TRANEXAMIC ACID 100 MG/ML
INJECTION, SOLUTION INTRAVENOUS
Status: DISCONTINUED | OUTPATIENT
Start: 2025-06-25 | End: 2025-06-25 | Stop reason: SDUPTHER

## 2025-06-25 RX ORDER — ONDANSETRON 2 MG/ML
4 INJECTION INTRAMUSCULAR; INTRAVENOUS EVERY 6 HOURS PRN
Status: DISCONTINUED | OUTPATIENT
Start: 2025-06-25 | End: 2025-06-28 | Stop reason: HOSPADM

## 2025-06-25 RX ORDER — AZITHROMYCIN MONOHYDRATE 500 MG/5ML
INJECTION, POWDER, LYOPHILIZED, FOR SOLUTION INTRAVENOUS
Status: DISCONTINUED
Start: 2025-06-25 | End: 2025-06-26

## 2025-06-25 RX ORDER — ONDANSETRON 2 MG/ML
INJECTION INTRAMUSCULAR; INTRAVENOUS
Status: DISCONTINUED | OUTPATIENT
Start: 2025-06-25 | End: 2025-06-25 | Stop reason: SDUPTHER

## 2025-06-25 RX ORDER — DOCUSATE SODIUM 100 MG/1
100 CAPSULE, LIQUID FILLED ORAL 2 TIMES DAILY
Status: DISCONTINUED | OUTPATIENT
Start: 2025-06-25 | End: 2025-06-28 | Stop reason: HOSPADM

## 2025-06-25 RX ORDER — DEXAMETHASONE SODIUM PHOSPHATE 4 MG/ML
INJECTION, SOLUTION INTRA-ARTICULAR; INTRALESIONAL; INTRAMUSCULAR; INTRAVENOUS; SOFT TISSUE
Status: DISCONTINUED | OUTPATIENT
Start: 2025-06-25 | End: 2025-06-25 | Stop reason: SDUPTHER

## 2025-06-25 RX ORDER — LIDOCAINE HYDROCHLORIDE AND EPINEPHRINE 20; 5 MG/ML; UG/ML
INJECTION, SOLUTION EPIDURAL; INFILTRATION; INTRACAUDAL; PERINEURAL
Status: DISCONTINUED | OUTPATIENT
Start: 2025-06-25 | End: 2025-06-25 | Stop reason: SDUPTHER

## 2025-06-25 RX ORDER — SODIUM CHLORIDE, SODIUM LACTATE, POTASSIUM CHLORIDE, CALCIUM CHLORIDE 600; 310; 30; 20 MG/100ML; MG/100ML; MG/100ML; MG/100ML
INJECTION, SOLUTION INTRAVENOUS
Status: DISCONTINUED | OUTPATIENT
Start: 2025-06-25 | End: 2025-06-25 | Stop reason: SDUPTHER

## 2025-06-25 RX ORDER — EPHEDRINE SULFATE/0.9% NACL/PF 25 MG/5 ML
10 SYRINGE (ML) INTRAVENOUS
Status: COMPLETED | OUTPATIENT
Start: 2025-06-25 | End: 2025-06-25

## 2025-06-25 RX ORDER — NALOXONE HYDROCHLORIDE 0.4 MG/ML
INJECTION, SOLUTION INTRAMUSCULAR; INTRAVENOUS; SUBCUTANEOUS PRN
Status: DISCONTINUED | OUTPATIENT
Start: 2025-06-25 | End: 2025-06-26

## 2025-06-25 RX ADMIN — SODIUM CHLORIDE, SODIUM LACTATE, POTASSIUM CHLORIDE, AND CALCIUM CHLORIDE 1000 ML: .6; .31; .03; .02 INJECTION, SOLUTION INTRAVENOUS at 02:36

## 2025-06-25 RX ADMIN — LIDOCAINE HYDROCHLORIDE AND EPINEPHRINE 10 ML: 20; 5 INJECTION, SOLUTION EPIDURAL; INFILTRATION; INTRACAUDAL; PERINEURAL at 13:33

## 2025-06-25 RX ADMIN — FAMOTIDINE 20 MG: 10 INJECTION, SOLUTION INTRAVENOUS at 13:42

## 2025-06-25 RX ADMIN — LIDOCAINE HYDROCHLORIDE AND EPINEPHRINE 5 ML: 20; 5 INJECTION, SOLUTION EPIDURAL; INFILTRATION; INTRACAUDAL; PERINEURAL at 13:44

## 2025-06-25 RX ADMIN — LIDOCAINE HYDROCHLORIDE AND EPINEPHRINE 3 ML: 15; 5 INJECTION, SOLUTION EPIDURAL; INFILTRATION; INTRACAUDAL; PERINEURAL at 05:18

## 2025-06-25 RX ADMIN — BUPIVACAINE HYDROCHLORIDE 4 ML: 2.5 INJECTION, SOLUTION EPIDURAL; INFILTRATION; INTRACAUDAL; PERINEURAL at 05:23

## 2025-06-25 RX ADMIN — KETOROLAC TROMETHAMINE 30 MG: 30 INJECTION, SOLUTION INTRAMUSCULAR at 17:20

## 2025-06-25 RX ADMIN — FENTANYL CITRATE 100 MCG: 50 INJECTION, SOLUTION INTRAMUSCULAR; INTRAVENOUS at 09:57

## 2025-06-25 RX ADMIN — MORPHINE SULFATE 4 MG: 1 INJECTION, SOLUTION EPIDURAL; INTRATHECAL; INTRAVENOUS at 15:19

## 2025-06-25 RX ADMIN — FENTANYL CITRATE 100 MCG: 50 INJECTION, SOLUTION INTRAMUSCULAR; INTRAVENOUS at 14:59

## 2025-06-25 RX ADMIN — Medication 120 MCG: at 07:16

## 2025-06-25 RX ADMIN — OXYTOCIN 2 MILLI-UNITS/MIN: 10 INJECTION, SOLUTION INTRAMUSCULAR; INTRAVENOUS at 10:37

## 2025-06-25 RX ADMIN — ACETAMINOPHEN 1000 MG: 500 TABLET ORAL at 21:11

## 2025-06-25 RX ADMIN — DOCUSATE SODIUM 100 MG: 100 CAPSULE, LIQUID FILLED ORAL at 21:11

## 2025-06-25 RX ADMIN — FENTANYL CITRATE 100 MCG: 50 INJECTION, SOLUTION INTRAMUSCULAR; INTRAVENOUS at 07:16

## 2025-06-25 RX ADMIN — MORPHINE SULFATE 3 MG: 1 INJECTION, SOLUTION EPIDURAL; INTRATHECAL; INTRAVENOUS at 14:15

## 2025-06-25 RX ADMIN — LIDOCAINE HYDROCHLORIDE 2 ML: 10 INJECTION, SOLUTION INFILTRATION; PERINEURAL at 05:18

## 2025-06-25 RX ADMIN — PROCHLORPERAZINE EDISYLATE 10 MG: 5 INJECTION INTRAMUSCULAR; INTRAVENOUS at 02:35

## 2025-06-25 RX ADMIN — OXYTOCIN 40 UNITS: 10 INJECTION, SOLUTION INTRAMUSCULAR; INTRAVENOUS at 14:10

## 2025-06-25 RX ADMIN — MORPHINE SULFATE 3 MG: 1 INJECTION, SOLUTION EPIDURAL; INTRATHECAL; INTRAVENOUS at 15:01

## 2025-06-25 RX ADMIN — ONDANSETRON 4 MG: 2 INJECTION, SOLUTION INTRAMUSCULAR; INTRAVENOUS at 13:42

## 2025-06-25 RX ADMIN — TRANEXAMIC ACID 1000 MG: 100 INJECTION, SOLUTION INTRAVENOUS at 14:18

## 2025-06-25 RX ADMIN — LIDOCAINE HYDROCHLORIDE AND EPINEPHRINE 5 ML: 20; 5 INJECTION, SOLUTION EPIDURAL; INFILTRATION; INTRACAUDAL; PERINEURAL at 09:57

## 2025-06-25 RX ADMIN — Medication 10 ML/HR: at 06:28

## 2025-06-25 RX ADMIN — FENTANYL CITRATE 100 MCG: 50 INJECTION, SOLUTION INTRAMUSCULAR; INTRAVENOUS at 13:15

## 2025-06-25 RX ADMIN — KETOROLAC TROMETHAMINE 30 MG: 30 INJECTION, SOLUTION INTRAMUSCULAR at 22:57

## 2025-06-25 RX ADMIN — LIDOCAINE HYDROCHLORIDE AND EPINEPHRINE 5 ML: 20; 5 INJECTION, SOLUTION EPIDURAL; INFILTRATION; INTRACAUDAL; PERINEURAL at 13:40

## 2025-06-25 RX ADMIN — HYDROMORPHONE HYDROCHLORIDE 1 MG: 1 INJECTION, SOLUTION INTRAMUSCULAR; INTRAVENOUS; SUBCUTANEOUS at 02:36

## 2025-06-25 RX ADMIN — LIDOCAINE HYDROCHLORIDE AND EPINEPHRINE 3 ML: 15; 5 INJECTION, SOLUTION EPIDURAL; INFILTRATION; INTRACAUDAL; PERINEURAL at 13:15

## 2025-06-25 RX ADMIN — EPHEDRINE SULFATE 10 MG: 5 INJECTION INTRAVENOUS at 05:33

## 2025-06-25 RX ADMIN — EPHEDRINE SULFATE 5 MG: 5 INJECTION INTRAVENOUS at 07:16

## 2025-06-25 RX ADMIN — DEXAMETHASONE SODIUM PHOSPHATE 8 MG: 4 INJECTION, SOLUTION INTRAMUSCULAR; INTRAVENOUS at 14:29

## 2025-06-25 RX ADMIN — CEFAZOLIN SODIUM 2000 MG: 1 POWDER, FOR SOLUTION INTRAMUSCULAR; INTRAVENOUS at 13:48

## 2025-06-25 RX ADMIN — SODIUM CHLORIDE, POTASSIUM CHLORIDE, SODIUM LACTATE AND CALCIUM CHLORIDE: 600; 310; 30; 20 INJECTION, SOLUTION INTRAVENOUS at 13:33

## 2025-06-25 RX ADMIN — Medication 120 MCG: at 14:25

## 2025-06-25 RX ADMIN — LIDOCAINE HYDROCHLORIDE AND EPINEPHRINE 2 ML: 15; 5 INJECTION, SOLUTION EPIDURAL; INFILTRATION; INTRACAUDAL; PERINEURAL at 05:16

## 2025-06-25 ASSESSMENT — PAIN DESCRIPTION - DESCRIPTORS
DESCRIPTORS: CRAMPING
DESCRIPTORS: ACHING
DESCRIPTORS: ACHING

## 2025-06-25 ASSESSMENT — PAIN DESCRIPTION - ORIENTATION
ORIENTATION: LOWER;MID
ORIENTATION: LOWER;MID
ORIENTATION: ANTERIOR;LOWER

## 2025-06-25 ASSESSMENT — PAIN SCALES - GENERAL
PAINLEVEL_OUTOF10: 0
PAINLEVEL_OUTOF10: 6
PAINLEVEL_OUTOF10: 8
PAINLEVEL_OUTOF10: 9

## 2025-06-25 ASSESSMENT — PAIN DESCRIPTION - LOCATION
LOCATION: ABDOMEN;BACK
LOCATION: ABDOMEN
LOCATION: ABDOMEN

## 2025-06-25 ASSESSMENT — PAIN - FUNCTIONAL ASSESSMENT
PAIN_FUNCTIONAL_ASSESSMENT: ACTIVITIES ARE NOT PREVENTED

## 2025-06-25 NOTE — PROGRESS NOTES
0058: Pt arrived to unit with CC of uterine ctx that have felt stronger since discharge on 6/24. Pt denies LOF, or bleeding and reports positive fetal movement.     0117: RN calling Karly ASHER to update on pt arrival, CC, EFM/TOCO tracing. CNM verbalizing understanding and will round on pt.    0142: Karly BUCHANANM at pt bedside performing pt assessment and reviewing EFM tracing. SVE: 1-2/70/-2.     0202: Brandt CNM returning to bedside educating pt on POC options including therapeutic rest with SVE recheck in a couple hours or sooner if labor progresses. Pt and FOB verbalizing understanding and agreement with POC for observation.     0418: Karly CNM performing pt assessment and SVE: 4cm. Pt educated on POC for admission.     4585-8900: Karly BUCHANANM performing bedside US to confirm fetal cephalic presentation. Karly BUCHANANM remaining at pt bedside.     0501: Lila ROBERTSON at pt bedside for epidural placement. This RN and Karly BUCHANANM also at bedside.     0523: Karly BUCHANANM performing SVE: 7-8/80/-2. Epidural bolus given per CRNA.     0526: Karly BUCHANANM leaving pt bedside.     0529: Karly ASHER and Jesus SHERIFF at pt bedside performing pt assessment including evaluating FHR interventions, VS, and medication administration d/t hypotension post epidural and quick cervical progression.     1135-0840: CRNA at pt bedside assessing pt, pt VS and administering BP medication.     0618: Jesus SHERIFF at pt bedside. MD performing SVE and AROM. SVE: 9/85/0. AROM clear fluid noted per Jesus SHERIFF.     0715: This RN giving SBAR report to Christina VYAS.

## 2025-06-25 NOTE — L&D DELIVERY NOTE
Amaury Rodriguez [[id number]]      Labor Events     Labor: No   Steroids: None  Cervical Ripening Date/Time:      Antibiotics Received during Labor: No  Rupture Identifier: Sac 1  Rupture Date/Time:  25 06:21:00   Rupture Type: AROM  Fluid Color: Clear  Fluid Odor: None  Fluid Volume: Moderate  Induction: None  Augmentation: AROM       Anesthesia    Method: Epidural       Delivery Details      Delivery Date: 25 Delivery Time: 14:09:00   Delivery Type: , Low Transverse  Trial of Labor?: Yes   Categorization: Repeat   Priority: unscheduled  Indications for : Active Phase Arrest, Failed        Skin Incision Type: Low Transverse  Uterine Incision: Low Transverse       Snyder Presentation    Presentation: Vertex       Shoulder Dystocia    Shoulder Dystocia Present?: No       Assisted Delivery Details    Forceps Attempted?: No  Vacuum Extractor Attempted?: No                           Cord                  Placenta    Date/Time: 2025 14:10:00  Removal: Manual Removal  Appearance: Intact  Disposition: Discarded       Lacerations           Blood Loss  Mother: Ana Rodriguez #[id number]     Start of Mother's Information      Delivery Blood Loss   Intrapartum & Postpartum: 25 1334 - 25 1556    Delivery Admission: 25 0045 - 25 1556         Intrapartum & Postpartum Delivery Admission    None                  End of Mother's Information  Mother: Ana Rodriguez #[id number]                Delivery Providers    Delivering clinician: Lila Boateng MD     Provider Role    Lila Boateng MD Obstetrician    Carisa Velázquez RN Primary Nurse    Dejah Tse RN Primary  Nurse     NICU Nurse     Neonatologist     Anesthesiologist    Pablo Garcia APRN - CRNA Nurse Anesthetist     Nursery Nurse     Respiratory Therapist    Florina Obrien RN Scrub Tech    Feng Simeon Assistant Surgeon               Assessment

## 2025-06-25 NOTE — PROGRESS NOTES
Labor Progress Note  Patient seen, fetal heart rate and contraction pattern evaluated, patient examined.    Vital Signs:  BP (!) 110/58   Pulse 84   Temp 98 °F (36.7 °C) (Oral)   Resp 18   SpO2 97%     Physical Exam:  Cervical Exam: 7  Membranes:  90  Uterine Activity: -1  Fetal Heart Rate: 150 bpm, moderate variability, +acels, no decels. Cat 1.    Assessment/Plan:  28 yo  at 39w2d admitted in term labor for TOLAC.    Term labor:  - /1 (check at 10am). Unchanged since 6am, but not yet on augmentation. IUPC placed and recommend augmentation with pitocin  - GBS neg  - Will continue attempt at vaginal delivery as strip remained category 1. She has had no further vaginal bleeding and amniotic fluid is clear. CD for standard maternal and fetal indications.  - Prior  for breech presentation    Pmhx:  Anxiety - no meds  Hx of PPD    Lila Boateng MD       =========================================================  12:00pm  Presented to bedside for recurrent variable decelerations. Patient having pain with contractions. CE: /-1, unchanged from prior. Contractions were initially inadequate at time of IUPC placement, but are now adequate on 4 of pitocin. Pitocin decreased to 3 and variables improved. Counseled patient that if no cervical change in next 2 hours I would recommend proceeding with  section for active phase arrest, sooner if category 2 tracing. Continue expectant management. CD for standard maternal and fetal indications.    Lila Boateng MD      ========================================================  12:15pm  Patient requesting to proceed with  now. We discussed that this is reasonable given the unchanged cervix in active phase and patient TOLAC status. Decision made to proceed with  for active phase arrest and failed TOLAC.     Discussed risks of  section to include but not limited to risk of damage to uterus and surrounding organs including bowel,  bladder, nerves, blood vessels, risk of bleeding, need for blood transfusion and its associated risks, infection, reoperation, prolonged hospital stay; and rarely hysterectomy. She understood and desired to proceed. All questions answered.     Lila Boateng MD

## 2025-06-25 NOTE — PROGRESS NOTES
0700:  SBAR report received from Sussy Diaz RN    1013:  Dr. Boateng to the bedside to evaluate the patient.  IUPC placed .  Cervix is 7cm.  Pt turned to left side, peanut ball in place.

## 2025-06-25 NOTE — ANESTHESIA POSTPROCEDURE EVALUATION
Department of Anesthesiology  Postprocedure Note    Patient: Ana Rodriguez  MRN: [id number]  YOB: 1995  Date of evaluation: 2025    Procedure Summary       Date: 25 Room / Location: [facility] L&D OR    Anesthesia Start: 0508 Anesthesia Stop: 153    Procedures:        SECTION      Labor Analgesia Diagnosis:        delivery affecting       ( delivery affecting  [P03.4])    Surgeons: Lila Boateng MD Responsible Provider: Cameron Medel MD    Anesthesia Type: Regional, Epidural ASA Status: 2            Anesthesia Type: Regional, Epidural    Tracey Phase I:      Tracey Phase II:      Anesthesia Post Evaluation    Patient location during evaluation: PACU  Patient participation: complete - patient participated  Level of consciousness: awake  Pain score: 0  Airway patency: patent  Nausea & Vomiting: no nausea and no vomiting  Cardiovascular status: blood pressure returned to baseline  Respiratory status: acceptable  Hydration status: euvolemic  Multimodal analgesia pain management approach  Pain management: adequate    No notable events documented.

## 2025-06-25 NOTE — OP NOTE
MRN: [id number]    PATIENT NAME: Ana Rodriguez    YOB: 1995    SURGEON: Lila Boateng MD    FIRST ASSISTANT: Feng Simeon; Catalino Harmon MD    SERVICE: OBSTETRICS/GYNECOLOGY    PREOPERATIVE DIAGNOSIS: active phase arrest, failed TOLAC, history of prior  section    POSTOPERATIVE DIAGNOSIS: Same    OPERATIVE PROCEDURE: repeat low transverse  section    ANESTHESIA: single shot spinal    Complications: right sided uterine incision extension down to the level of the cervix    FINDINGS: Normal uterus, ovaries, fallopian tubes. Liveborn male infant, APGARs 8/9, 3335g. Dense adhesions of the bladder peritoneum to rectus muscle and fascia. Bladder adhered to lower uterine segment, this was taken down with a bladder flap. Following delivery of the infant there was 4 cm extension on the right side of the hysterotomy down to the level of there cervix.    ESTIMATED BLOOD LOSS: 1390cc    ANTIBIOTICS: Ancef 2000mg, Azithromycin 500mg    SPECIMENS: placenta, discarded    INDICATIONS: active phase arrest, failed TOLAC, history of prior  section. The patient was appropriately counseled, and once informed consent was obtained, she was taken to the operating room.    PROCEDURE: The patient was taken to the operating room, a single shot spinal was placed by anesthesiology and found to be adequate, and the patient was placed in the dorsal supine position with a left lateral tilt. She was prepped and draped in the usual sterile fashion. Prior to the incision, the level of anesthesia was rechecked and found to be adequate. A time out was performed. A Pfannenstiel skin incision was made 2 finger breadths above the pubic symphysis at the site of prior incision and taken down to the level of the fascia with a bovie. The fascia was incised bilaterally on either side of the midline. The fascial incision was extended bilaterally with Carl[place] scissors. The inferior aspect of the fascia was grasped with

## 2025-06-25 NOTE — H&P
History and Physical    Patient: Ana Rodriguez MRN: [id number]  SSN: [id number]    YOB: 1995  Age: 29 y.o.  Sex: female      Subjective:      Ana Rodriguez is a 29 y.o. female who is a  at  39.2 weeks gestation (JAIDEN 2025) who came in due to having  had contractions for the past 24 hours, but states her contractions became much stronger about 2-3 hours ago. Pt denies any LOF, but has noted some vaginal spotting, and +FM. Pt reports she is very uncomfortable and having difficulty with copping well.     Pt reports she had a  on 2023 due to fetal breech presentation, and has been approved to TOLAC.  Pt desires to TOLAC.      No past medical history on file.  Past Surgical History:   Procedure Laterality Date     SECTION, LOW TRANSVERSE        No family history on file.  Social History     Tobacco Use    Smoking status: Never    Smokeless tobacco: Never   Substance Use Topics    Alcohol use: Not Currently      Prior to Admission medications    Medication Sig Start Date End Date Taking? Authorizing Provider   [id number]-Q3-W0-J26-N2-SD (PRENA1 PO) Take by mouth   Yes Provider, MD Gabbi   acetaminophen (TYLENOL) 500 MG tablet Take 2 tablets by mouth in the morning and 2 tablets at noon and 2 tablets in the evening.  Patient not taking: Reported on 2025   Automatic Reconciliation, Ar   Ferrous Sulfate 324 MG TBEC Take 325 mg by mouth 2 times daily (with meals)  Patient not taking: Reported on 2025   Automatic Reconciliation, Ar        No Known Allergies    Review of Systems:  A comprehensive review of systems was negative except for that written in the History of Present Illness.    Objective:     There were no vitals filed for this visit.     Cat 1 FHR tracin bpm, acels present, no decels, moderate variability. Contractions q 3-6 minutes apart. Contractions are moderate to strength, and soft when resting.     Physical Exam:  General  Appearance: alert and oriented to person, place and time and with anxious affect  Head: normocephalic and atraumatic  Eyes: pupils equal, round, and reactive to light  Pulmonary/Chest: clear to auscultation bilaterally- no wheezes, rales or rhonchi, normal air movement, no respiratory distress  Cardiovascular: normal rate and normal S1 and S2  Abdomen: soft, non-tender  Pelvic: normal external genitalia, vulva, vagina, cervix, uterus and adnexa and Cervix:  2.5 cm / 70 % / -2 / mid-position/ medium.     Assessment:     1) Term Gestation   2) Cat 1 FHR tracing   3) Hx of  on 2023.  4) Early Labor   5) TOLAC   6) GBS: Negative   7) Hx of Anxiety - not on medications    Plan:     1) Admit for Observation   2) Labs to be collected   3)  2 L IV Fluid boluses to be given.   4) IV Dilaudid and IV Compazine for therapeutic relief.   5) Pt to be reassess in 2-3 hours, or sooner prn.     Signed By: FREDDIE Joshua CNM     2025

## 2025-06-25 NOTE — PROGRESS NOTES
[facility] Labor Progress Note     Patient: Ana Rodriguez MRN: [id number]  SSN: [id number]    YOB: 1995  Age: 29 y.o.  Sex: female        Subjective:   Patient reports she is starting to feel some relief from her contractions post epidural.    Objective:   Blood pressure (!) 64/36, pulse 71, temperature 98 °F (36.7 °C), temperature source Oral, resp. rate 18, SpO2 97%.    Fetal heart baseline 145 , moderate  variability, accelerations present, intermittent variable decelerations  present, Uterine contractions q 2-4 minutes, strong to palpation, resting tone soft.    Sterile Vaginal Exam: 6.5 cm dilated/ 80 % effaced/  -2 station, cervix anterior or midline or posterior , fetal presentation vertex, membranes intact .      Assessment:       39w2d    Category 2 fetal heart rate tracing     Active Labor     TOLAC     GBS: Negative     Hx of Anxiety - not on medications    Plan:   Continue current orders/management     Pt is in spontaneous labor    Possible suspicion of an uterine abruption.   Dr. Lee ([facility] hospitalist is made aware to come assess chux pad that is approx. 75 ml of red blood, with a dime size clot.  He quickly came to bedside and reported he was not concerned of a uterine abruption at this time, but suggests to collect a Fibrinogen lab work stat to help determine   Uterine abruption risk.     Lila Geller CRNA is called back to the bedside STAT due to significantly low blood pressure post Epidural placement, that made pt symptomatic along with a FHR deceleration.  She quickly camr to bedside, see her note.     Pt is debrief and she and her partner verbalize understanding.     CNM management until 8 am.      Anticipate     FREDDIE Joshua CNM

## 2025-06-25 NOTE — PROGRESS NOTES
This is a 28 y/o F  TOLAC in spontaneous labor. Prior variable declerations appear resolved after treatment for epidural hypotension with pressors. Scant portwine colored vaginal blood noted on pad. Cervix checked and was 85%/9, 0 station. There was no pooling of blood clots and amniotomy clear. VS stable. Will continue to attempt vaginal delivery as long as strip remains CAT I. Will still observe carefully for possible early abruption and a fibrinogen level is pending at this time. Does not appear to have evidence of uterine rupture.

## 2025-06-25 NOTE — ANESTHESIA PRE PROCEDURE
Department of Anesthesiology  Preprocedure Note       Name:  Ana Rodriguez   Age:  29 y.o.  :  1995                                          MRN:  [id number]         Date:  2025      Surgeon: * No surgeons listed *    Procedure: * No procedures listed *    Medications prior to admission:   Prior to Admission medications    Medication Sig Start Date End Date Taking? Authorizing Provider   [id number]-V0-[id number]-C1-XQ (PRENA1 PO) Take by mouth   Yes Provider, MD Gabbi   acetaminophen (TYLENOL) 500 MG tablet Take 2 tablets by mouth in the morning and 2 tablets at noon and 2 tablets in the evening.  Patient not taking: Reported on 2025   Automatic Reconciliation, Ar   Ferrous Sulfate 324 MG TBEC Take 325 mg by mouth 2 times daily (with meals)  Patient not taking: Reported on 2025   Automatic Reconciliation, Ar       Current medications:    Current Facility-Administered Medications   Medication Dose Route Frequency Provider Last Rate Last Admin   • ondansetron (ZOFRAN-ODT) disintegrating tablet 4 mg  4 mg Oral Q8H PRN Nemo Brandt APRN - CNM        Or   • ondansetron (ZOFRAN) injection 4 mg  4 mg IntraVENous Q6H PRN Nemo Brandt APRN - CNM       • acetaminophen (TYLENOL) tablet 1,000 mg  1,000 mg Oral 3 times per day Nemo Brandt APRN - CNM       • HYDROmorphone HCl PF (DILAUDID) injection 1 mg  1 mg IntraVENous Q3H PRN Nemo Brandt APRN - CNM   1 mg at 25 0236   • prochlorperazine (COMPAZINE) injection 10 mg  10 mg IntraVENous Q6H PRN Nemo Brandt APRN - CNM   10 mg at 25 0235   • fentaNYL 2 mcg/mL BUPivacaine 0.125% in sodium chloride 0.9% 100 mL epidural infusion  10 mL/hr Epidural Continuous Lila Geller, APRN - CRNA       • naloxone 0.4 mg in 10 mL sodium chloride syringe   IntraVENous PRN Duyen Lila EVELYN, APRN - CRNA       • ondansetron (ZOFRAN) injection 4 mg  4 mg IntraVENous Q6H PRN Lila Geller, APRN - CRNA         Facility-Administered Medications

## 2025-06-25 NOTE — ANESTHESIA PROCEDURE NOTES
Epidural Block    Patient location during procedure: OB  Start time: 6/25/2025 5:08 AM  End time: 6/25/2025 5:17 AM  Reason for block: labor epidural  Staffing  Performed: resident/CRNA   Anesthesiologist: Sarkis Hoyos DO  Resident/CRNA: Lila Geller APRN - CRNA  Performed by: Lila Geller APRN - CRNA  Authorized by: Sarkis Hoyos DO    Epidural  Patient position: sitting  Prep: ChloraPrep  Patient monitoring: frequent blood pressure checks and continuous pulse ox  Approach: midline  Location: L3-4  Injection technique: LILLIAN saline  Provider prep: sterile gloves  Needle  Needle type: Tuohy   Needle gauge: 17 G  Needle length: 3.5 in  Needle insertion depth: 5 cm  Catheter type: end hole  Catheter size: 19 G  Catheter at skin depth: 5 cm  Test dose: negativeCatheter Secured: tegaderm and tape  Assessment  Sensory level: T10  Hemodynamics: stable  Attempts: 1  Outcomes: patient tolerated procedure well and uncomplicated  Preanesthetic Checklist  Completed: patient identified, IV checked, site marked, risks and benefits discussed, surgical/procedural consents, equipment checked, pre-op evaluation, timeout performed, anesthesia consent given, oxygen available, monitors applied/VS acknowledged, fire risk safety assessment completed and verbalized and blood product R/B/A discussed and consented

## 2025-06-25 NOTE — PROGRESS NOTES
CNM Labor Progress Note     Patient: Ana Rodriguez MRN: [id number]  SSN: [id number]    YOB: 1995  Age: 29 y.o.  Sex: female        Subjective:   Patient reports her contractions are getting much stronger than before, and is occurring on her hips and her lower uterine.       Objective:   Blood pressure (!) 99/55, pulse 74, temperature 98 °F (36.7 °C), temperature source Oral, resp. rate 18, SpO2 96%.    Fetal heart baseline 140, moderate variability, accelerations present, decelerations not present, Uterine contractions q 4-5 minutes,  strong to palpation, resting tone soft.    Sterile Vaginal Exam: 4.5 cm dilated/ 70% effaced/ -2 station, cervix  midline , fetal presentation vertex, membranes intact / BBOW.    Abdominal bedside u/s =  Fetal Vertex position, LOT position.     Assessment:     39w2d  Category 1 fetal heart rate tracing   Labor   TOLAC   GBS: Negative   Hx of Anxiety - not on medications       Plan:   Pt to be admitted to L&D unit.     Labs collected.     IV Fluid Bolus to be started to be able to receive an Epidural for pain relief.     CNM management     Anticipate     FREDDIE Joshua CNM

## 2025-06-26 LAB
ERYTHROCYTE [DISTWIDTH] IN BLOOD BY AUTOMATED COUNT: 14.5 % (ref 11.5–14.5)
HCT VFR BLD AUTO: 26.7 % (ref 35–47)
HGB BLD-MCNC: 8.7 G/DL (ref 11.5–16)
MCH RBC QN AUTO: 31.5 PG (ref 26–34)
MCHC RBC AUTO-ENTMCNC: 32.6 G/DL (ref 30–36.5)
MCV RBC AUTO: 96.7 FL (ref 80–99)
NRBC # BLD: 0 K/UL (ref 0–0.01)
NRBC BLD-RTO: 0 PER 100 WBC
PLATELET # BLD AUTO: 170 K/UL (ref 150–400)
PMV BLD AUTO: 11.3 FL (ref 8.9–12.9)
RBC # BLD AUTO: 2.76 M/UL (ref 3.8–5.2)
WBC # BLD AUTO: 22.6 K/UL (ref 3.6–11)

## 2025-06-26 PROCEDURE — 94761 N-INVAS EAR/PLS OXIMETRY MLT: CPT

## 2025-06-26 PROCEDURE — 6370000000 HC RX 637 (ALT 250 FOR IP): Performed by: STUDENT IN AN ORGANIZED HEALTH CARE EDUCATION/TRAINING PROGRAM

## 2025-06-26 PROCEDURE — 6370000000 HC RX 637 (ALT 250 FOR IP): Performed by: OBSTETRICS & GYNECOLOGY

## 2025-06-26 PROCEDURE — 36415 COLL VENOUS BLD VENIPUNCTURE: CPT

## 2025-06-26 PROCEDURE — 1120000000 HC RM PRIVATE OB

## 2025-06-26 PROCEDURE — 6360000002 HC RX W HCPCS: Performed by: STUDENT IN AN ORGANIZED HEALTH CARE EDUCATION/TRAINING PROGRAM

## 2025-06-26 PROCEDURE — 85027 COMPLETE CBC AUTOMATED: CPT

## 2025-06-26 RX ORDER — OXYCODONE HYDROCHLORIDE 5 MG/1
10 TABLET ORAL EVERY 4 HOURS PRN
Refills: 0 | Status: DISCONTINUED | OUTPATIENT
Start: 2025-06-26 | End: 2025-06-28 | Stop reason: HOSPADM

## 2025-06-26 RX ORDER — SIMETHICONE 80 MG
80 TABLET,CHEWABLE ORAL EVERY 6 HOURS PRN
Status: DISCONTINUED | OUTPATIENT
Start: 2025-06-26 | End: 2025-06-28 | Stop reason: HOSPADM

## 2025-06-26 RX ORDER — OXYCODONE HYDROCHLORIDE 5 MG/1
5 TABLET ORAL EVERY 4 HOURS PRN
Refills: 0 | Status: DISCONTINUED | OUTPATIENT
Start: 2025-06-26 | End: 2025-06-28 | Stop reason: HOSPADM

## 2025-06-26 RX ADMIN — DOCUSATE SODIUM 100 MG: 100 CAPSULE, LIQUID FILLED ORAL at 10:14

## 2025-06-26 RX ADMIN — OXYCODONE 5 MG: 5 TABLET ORAL at 02:14

## 2025-06-26 RX ADMIN — OXYCODONE 10 MG: 5 TABLET ORAL at 10:14

## 2025-06-26 RX ADMIN — ACETAMINOPHEN 1000 MG: 500 TABLET ORAL at 02:13

## 2025-06-26 RX ADMIN — OXYCODONE 10 MG: 5 TABLET ORAL at 05:56

## 2025-06-26 RX ADMIN — DOCUSATE SODIUM 100 MG: 100 CAPSULE, LIQUID FILLED ORAL at 19:57

## 2025-06-26 RX ADMIN — KETOROLAC TROMETHAMINE 30 MG: 30 INJECTION, SOLUTION INTRAMUSCULAR at 10:17

## 2025-06-26 RX ADMIN — ACETAMINOPHEN 1000 MG: 500 TABLET ORAL at 19:57

## 2025-06-26 RX ADMIN — OXYCODONE 10 MG: 5 TABLET ORAL at 22:43

## 2025-06-26 RX ADMIN — ACETAMINOPHEN 1000 MG: 500 TABLET ORAL at 12:22

## 2025-06-26 RX ADMIN — SIMETHICONE 80 MG: 80 TABLET, CHEWABLE ORAL at 12:35

## 2025-06-26 RX ADMIN — SIMETHICONE 80 MG: 80 TABLET, CHEWABLE ORAL at 18:36

## 2025-06-26 RX ADMIN — OXYCODONE 10 MG: 5 TABLET ORAL at 18:36

## 2025-06-26 RX ADMIN — OXYCODONE 10 MG: 5 TABLET ORAL at 14:34

## 2025-06-26 RX ADMIN — KETOROLAC TROMETHAMINE 30 MG: 30 INJECTION, SOLUTION INTRAMUSCULAR at 03:43

## 2025-06-26 RX ADMIN — IBUPROFEN 800 MG: 800 TABLET ORAL at 16:29

## 2025-06-26 ASSESSMENT — PAIN DESCRIPTION - LOCATION
LOCATION: ABDOMEN
LOCATION: INCISION
LOCATION: ABDOMEN
LOCATION: ABDOMEN;INCISION
LOCATION: ABDOMEN;INCISION
LOCATION: ABDOMEN
LOCATION: INCISION
LOCATION: ABDOMEN

## 2025-06-26 ASSESSMENT — PAIN DESCRIPTION - DESCRIPTORS
DESCRIPTORS: ACHING
DESCRIPTORS: ACHING
DESCRIPTORS: SORE
DESCRIPTORS: CRAMPING;SORE
DESCRIPTORS: SORE
DESCRIPTORS: SORE;ACHING
DESCRIPTORS: ACHING;SORE
DESCRIPTORS: SORE
DESCRIPTORS: ACHING

## 2025-06-26 ASSESSMENT — PAIN SCALES - GENERAL
PAINLEVEL_OUTOF10: 6
PAINLEVEL_OUTOF10: 8
PAINLEVEL_OUTOF10: 8
PAINLEVEL_OUTOF10: 10
PAINLEVEL_OUTOF10: 5
PAINLEVEL_OUTOF10: 8
PAINLEVEL_OUTOF10: 9
PAINLEVEL_OUTOF10: 10
PAINLEVEL_OUTOF10: 9
PAINLEVEL_OUTOF10: 10
PAINLEVEL_OUTOF10: 9

## 2025-06-26 ASSESSMENT — PAIN DESCRIPTION - ORIENTATION
ORIENTATION: LOWER;MID
ORIENTATION: LOWER
ORIENTATION: LOWER;MID
ORIENTATION: LOWER
ORIENTATION: LOWER;MID

## 2025-06-26 ASSESSMENT — PAIN - FUNCTIONAL ASSESSMENT
PAIN_FUNCTIONAL_ASSESSMENT: ACTIVITIES ARE NOT PREVENTED

## 2025-06-26 NOTE — LACTATION NOTE
This note was copied from a baby's chart.  Baby is NICU at this time.  Mother plans to breastfeed and is pumping at this time. Pumping reviewed with mother. Mother measured for flange size.  Mother given 27mm flanges and Medela Symphony pump use reviewed.   Mother and father  aware of how to clean pump parts and supplies needed.    Discussed with mother her plan for feeding.  Reviewed the benefits of exclusive breast milk feeding during the hospital stay.   IShe acknowledges understanding of information reviewed and states that it is her plan to breastfeed her infant.      Will support her choice and offer additional information as needed. Infant admitted to NICU.    Pt will successfully establish breast milk supply by pumping with a hospital grade pump every 2-3 hours for approximately 20 minutes/8-10 x day.  To maximize milk production mom taught to incorporate breast massage before and hand expression after each pumping session.  All expressed breast milk (EBM) will be provided for infant(s) use.  The value of skin to skin bonding emphasized.  The breast will be offered as baby is ready; with the goal of eventual transition to breastfeeding. Importance of maintaining milk supply through pumping emphasized as infant(s) learns to nurse.          Left Breast: Soft  Left Nipple: Protrude  Right Nipple: Protrude  Right Breast: Soft        Maternal Response: Comfortable           Latch:  (did not see at breast at this time)

## 2025-06-26 NOTE — CARE COORDINATION
2025  12:13 PM    Care Management Progress Note    Reason for Admission:   Normal labor [O80, Z37.9]  Procedure(s) (LRB):   SECTION (N/A)  1 Day Post-Op    Patient Admission Status: Inpatient    Transition of care plan:    CM met with MOB to complete initial assessment and begin discharge planning.  MOB verified and confirmed demographics.  NOEMI lives with FOB, at the address on file. MOB reports she has good family support, and feels like she has the support she needs when she returns home.  MOB plans to breast feed baby and has pump to use at home.  [facility] will provide [facility] up care for [facility]. MOB has car seat, bassinet/crib, clothing, bottles and all necessary supplies for baby. MOB confirms, infant will be added to [employer] insurance. Infant admitted to NICU for respiratory support. CM monitoring for needs.       25 1212   Service Assessment   Patient Orientation Alert and Oriented   Cognition Alert   History Provided By Patient   Primary Caregiver Self   Support Systems Spouse/Significant Other   PCP Verified by CM Yes   Last Visit to PCP Within last 3 months   Prior Functional Level Independent in ADLs/IADLs   Current Functional Level Independent in ADLs/IADLs   Can patient return to prior living arrangement Yes   Ability to make needs known: Good   Family able to assist with home care needs: Yes   Would you like for me to discuss the discharge plan with any other family members/significant others, and if so, who? No   Financial Resources Other (Comment)     Wolf Lucia CM

## 2025-06-26 NOTE — PROGRESS NOTES
Post-Operative  Day 1    Ana Rodriguez     Assessment: Post-Op day 1, stable    Plan:     - Routine post-operative care.  - Acute Blood loss anemia: 4 cm right extension. EBL 1.4L. TXA. Hb 12.9 -> 8.7. IV Fe. Rpt CBC in am. Plan to start Fe at discharge if pt anemic.  -  Hx of PPD. Anxiety- no meds.   - Ambulate today.      Information for the patient's :  Amaury Rodriguez [[id number]]   , Low Transverse  Patient doing well without significant complaint.  Tolerating diet.  Galdamez out.  Ambulating.  Denies fever, chills, CP, SOB, calf pain/tenderness. Tolerating diet.      Vitals:  /70   Pulse 93   Temp 97.7 °F (36.5 °C) (Oral)   Resp 17   SpO2 100%   Breastfeeding Unknown   Temp (24hrs), Av.9 °F (36.6 °C), Min:97.5 °F (36.4 °C), Max:98.4 °F (36.9 °C)      Last 24hr Input/Output:    Intake/Output Summary (Last 24 hours) at 2025 1008  Last data filed at 2025 0530  Gross per 24 hour   Intake --   Output 4190 ml   Net -4190 ml          Exam:     Patient without distress.               Fundus firm, nontender per nursing fundal checks.  Incision bandaged, clean, dry, intact.              Perineum with normal lochia noted per nursing assessment.              Lower extremities are negative for pathological edema.    Labs:   Lab Results   Component Value Date/Time    WBC 22.6 2025 06:11 AM    WBC 18.1 2025 02:17 AM    WBC 12.1 2025 09:22 AM    WBC 11.9 03/10/2025 10:11 PM    WBC 16.2 2023 04:22 AM    WBC 9.3 2023 07:59 AM    HGB 8.7 2025 06:11 AM    HGB 12.9 2025 02:17 AM    HGB 14.3 2025 09:22 AM    HGB 13.9 03/10/2025 10:11 PM    HGB 9.1 2023 04:22 AM    HGB 13.1 2023 07:59 AM    HCT 26.7 2025 06:11 AM    HCT 37.9 2025 02:17 AM    HCT 43.0 2025 09:22 AM    HCT 40.8 03/10/2025 10:11 PM    HCT 27.7 2023 04:22 AM    HCT 39.4 2023 07:59 AM     2025 06:11 AM

## 2025-06-27 LAB
BASOPHILS # BLD: 0.03 K/UL (ref 0–0.1)
BASOPHILS NFR BLD: 0.2 % (ref 0–1)
DIFFERENTIAL METHOD BLD: ABNORMAL
EOSINOPHIL # BLD: 0.07 K/UL (ref 0–0.4)
EOSINOPHIL NFR BLD: 0.4 % (ref 0–7)
ERYTHROCYTE [DISTWIDTH] IN BLOOD BY AUTOMATED COUNT: 14.5 % (ref 11.5–14.5)
HCT VFR BLD AUTO: 25.7 % (ref 35–47)
HGB BLD-MCNC: 8.4 G/DL (ref 11.5–16)
IMM GRANULOCYTES # BLD AUTO: 0.15 K/UL (ref 0–0.04)
IMM GRANULOCYTES NFR BLD AUTO: 0.9 % (ref 0–0.5)
LYMPHOCYTES # BLD: 3.52 K/UL (ref 0.8–3.5)
LYMPHOCYTES NFR BLD: 21.7 % (ref 12–49)
MCH RBC QN AUTO: 31.7 PG (ref 26–34)
MCHC RBC AUTO-ENTMCNC: 32.7 G/DL (ref 30–36.5)
MCV RBC AUTO: 97 FL (ref 80–99)
MONOCYTES # BLD: 0.95 K/UL (ref 0–1)
MONOCYTES NFR BLD: 5.9 % (ref 5–13)
NEUTS SEG # BLD: 11.48 K/UL (ref 1.8–8)
NEUTS SEG NFR BLD: 70.9 % (ref 32–75)
NRBC # BLD: 0 K/UL (ref 0–0.01)
NRBC BLD-RTO: 0 PER 100 WBC
PLATELET # BLD AUTO: 162 K/UL (ref 150–400)
PMV BLD AUTO: 11 FL (ref 8.9–12.9)
RBC # BLD AUTO: 2.65 M/UL (ref 3.8–5.2)
WBC # BLD AUTO: 16.2 K/UL (ref 3.6–11)

## 2025-06-27 PROCEDURE — 1120000000 HC RM PRIVATE OB

## 2025-06-27 PROCEDURE — 36415 COLL VENOUS BLD VENIPUNCTURE: CPT

## 2025-06-27 PROCEDURE — 6370000000 HC RX 637 (ALT 250 FOR IP): Performed by: OBSTETRICS & GYNECOLOGY

## 2025-06-27 PROCEDURE — 6370000000 HC RX 637 (ALT 250 FOR IP): Performed by: STUDENT IN AN ORGANIZED HEALTH CARE EDUCATION/TRAINING PROGRAM

## 2025-06-27 PROCEDURE — 85025 COMPLETE CBC W/AUTO DIFF WBC: CPT

## 2025-06-27 RX ORDER — BENZOCAINE/MENTHOL 6 MG-10 MG
LOZENGE MUCOUS MEMBRANE 2 TIMES DAILY
Status: DISCONTINUED | OUTPATIENT
Start: 2025-06-27 | End: 2025-06-28 | Stop reason: HOSPADM

## 2025-06-27 RX ORDER — IBUPROFEN 800 MG/1
800 TABLET, FILM COATED ORAL EVERY 8 HOURS
Qty: 120 TABLET | Refills: 3 | Status: SHIPPED | OUTPATIENT
Start: 2025-06-27

## 2025-06-27 RX ORDER — FERROUS SULFATE 325(65) MG
325 TABLET ORAL
Qty: 60 TABLET | Refills: 1 | Status: SHIPPED | OUTPATIENT
Start: 2025-06-27

## 2025-06-27 RX ORDER — PSEUDOEPHEDRINE HCL 30 MG
100 TABLET ORAL 2 TIMES DAILY
Qty: 60 CAPSULE | Refills: 1 | Status: SHIPPED | OUTPATIENT
Start: 2025-06-27

## 2025-06-27 RX ORDER — OXYCODONE HYDROCHLORIDE 5 MG/1
5 TABLET ORAL EVERY 4 HOURS PRN
Qty: 12 TABLET | Refills: 0 | Status: SHIPPED | OUTPATIENT
Start: 2025-06-27 | End: 2025-06-30

## 2025-06-27 RX ADMIN — OXYCODONE 10 MG: 5 TABLET ORAL at 15:37

## 2025-06-27 RX ADMIN — CAMPHOR, MENTHOL: .5; .5 LOTION TOPICAL at 21:09

## 2025-06-27 RX ADMIN — OXYCODONE 10 MG: 5 TABLET ORAL at 06:47

## 2025-06-27 RX ADMIN — DOCUSATE SODIUM 100 MG: 100 CAPSULE, LIQUID FILLED ORAL at 21:08

## 2025-06-27 RX ADMIN — ACETAMINOPHEN 1000 MG: 500 TABLET ORAL at 05:11

## 2025-06-27 RX ADMIN — IBUPROFEN 800 MG: 800 TABLET ORAL at 08:34

## 2025-06-27 RX ADMIN — OXYCODONE 10 MG: 5 TABLET ORAL at 10:47

## 2025-06-27 RX ADMIN — IBUPROFEN 800 MG: 800 TABLET ORAL at 16:32

## 2025-06-27 RX ADMIN — HYDROCORTISONE: 1 CREAM TOPICAL at 21:09

## 2025-06-27 RX ADMIN — OXYCODONE 10 MG: 5 TABLET ORAL at 19:14

## 2025-06-27 RX ADMIN — OXYCODONE 10 MG: 5 TABLET ORAL at 02:51

## 2025-06-27 RX ADMIN — ACETAMINOPHEN 1000 MG: 500 TABLET ORAL at 21:08

## 2025-06-27 RX ADMIN — DOCUSATE SODIUM 100 MG: 100 CAPSULE, LIQUID FILLED ORAL at 08:34

## 2025-06-27 RX ADMIN — IBUPROFEN 800 MG: 800 TABLET ORAL at 00:31

## 2025-06-27 RX ADMIN — ACETAMINOPHEN 1000 MG: 500 TABLET ORAL at 13:01

## 2025-06-27 ASSESSMENT — PAIN - FUNCTIONAL ASSESSMENT
PAIN_FUNCTIONAL_ASSESSMENT: ACTIVITIES ARE NOT PREVENTED

## 2025-06-27 ASSESSMENT — PAIN DESCRIPTION - DESCRIPTORS
DESCRIPTORS: ACHING;BURNING
DESCRIPTORS: SORE
DESCRIPTORS: ACHING;BURNING
DESCRIPTORS: BURNING;ACHING
DESCRIPTORS: SORE
DESCRIPTORS: ACHING;BURNING
DESCRIPTORS: ACHING
DESCRIPTORS: SORE;ACHING
DESCRIPTORS: ACHING;BURNING
DESCRIPTORS: SORE
DESCRIPTORS: ACHING;BURNING

## 2025-06-27 ASSESSMENT — PAIN DESCRIPTION - LOCATION
LOCATION: INCISION
LOCATION: ABDOMEN
LOCATION: ABDOMEN;INCISION
LOCATION: ABDOMEN
LOCATION: INCISION
LOCATION: ABDOMEN

## 2025-06-27 ASSESSMENT — PAIN SCALES - GENERAL
PAINLEVEL_OUTOF10: 7
PAINLEVEL_OUTOF10: 4
PAINLEVEL_OUTOF10: 9
PAINLEVEL_OUTOF10: 8
PAINLEVEL_OUTOF10: 5
PAINLEVEL_OUTOF10: 8
PAINLEVEL_OUTOF10: 8
PAINLEVEL_OUTOF10: 3

## 2025-06-27 ASSESSMENT — PAIN DESCRIPTION - ORIENTATION
ORIENTATION: LOWER

## 2025-06-27 NOTE — DISCHARGE INSTRUCTIONS
POST DELIVERY DISCHARGE INSTRUCTIONS    Name: Ana Rodriguez  YOB: 1995  Primary Diagnosis: [unfilled]    General:     Diet/Diet Restrictions:  Eight 8-ounce glasses of fluid daily (water, juices); avoid excessive caffeine intake.  Meals/snacks as desired which are high in fiber and carbohydrates and low in fat and cholesterol.      Physical Activity / Restrictions / Safety:     Avoid heavy lifting, no more that 8 lbs. For 2-3 weeks;   Limit use of stairs to 2 times daily for the first week home.   No driving for one week.  Avoid intercourse 4-6 weeks, no douching or tampon use.   Check with obstetrician before starting or resuming an exercise program.      Discharge Instructions/Special Treatment/Home Care Needs:     Continue prenatal vitamins.  Continue to use squirt bottle with warm water on your episiotomy after each bathroom use until bleeding stops.  If steri-strips applied to your incision, remove in 7-10 days.    Call your doctor for the following:     Fever over 101 degrees by mouth.  Vaginal bleeding heavier than a normal menstrual period or clots larger than a golf ball.  Red streaks or increased swelling of legs, painful red streaks on your breast.  Painful urination, constipation and increased pain or swelling or discharge with your incision.  If you feel extremely anxious or overwhelmed.  If you have thoughts of harming yourself and/or your baby.    Pain Management:     Take Acetaminophen (Tylenol) or Ibuprofen (Advil, Motrin), as directed for pain.   Use a warm Sitz bath 3 times daily to relieve episiotomy or hemorrhoidal discomfort.   For hemorrhoidal discomfort, use Tucks and Anusol cream as needed and directed.  Heating pad to  incision as needed.     Follow-Up Care:     Appointment with MD: [unfilled]  Telephone number: [phone]    Signed By: BRYCE BLAKELY MD                                                                                                   Date:

## 2025-06-27 NOTE — PROGRESS NOTES
Spiritual Health History and Assessment/Progress Note  [facility]    Initial Encounter,  ,  ,      Name: Ana Rodriguez MRN: [id number]    Age: 29 y.o.     Sex: female   Language: English   Restorationism: Mandaen   Normal labor     Date: 6/27/2025            Total Time Calculated: 15 min              Spiritual Assessment began in [facility] C4 MOTHER INF[facility] UNIT            Encounter Overview/Reason: Initial Encounter  Service Provided For: Patient and family together    Heather, Belief, Meaning:   Patient identifies as spiritual, is connected with a heather tradition or spiritual practice, and has beliefs or practices that help with coping during difficult times  Family/Friends identify as spiritual, are connected with a heather tradition or spiritual practice, and have beliefs or practices that help with coping during difficult times      Importance and Influence:  Patient has spiritual/personal beliefs that influence decisions regarding their health  Family/Friends have spiritual/personal beliefs that influence decisions regarding the patient's health    Community:  Patient is connected with a spiritual community and feels well-supported. Support system includes: Spouse/Partner, Parent/s, Heather Community, Friends, and Extended family  Family/Friends are connected with a spiritual community: and feel well-supported. Support system includes: Spouse/Partner, Parent/s, Heather Community, Friends, and Extended family    Assessment and Plan of Care:   Reviewed chart and consulted with attending nurse prior to bedside visit. Pt has a new born in NICU and one other young son back home with their parents caring for him. Mrs Rodriguez welcomes  and has her  Mr Rodriguez present. Pt and  shared that they are feeling excited and happy that their new baby is coming out of NICU to be with her and that she is scheduled to be discharged tomorrow. They feel they have lots of good support from family to Gnosticist family

## 2025-06-27 NOTE — PROGRESS NOTES
Post-Operative  Day 2    Ana Rodriguez     Assessment: Post-Op day 2, doing well    Plan:   - Routine post-operative care.  - Hgb 8.7 > 8.4. s/p IV iron. Will continue on PO iron upon discharge.   - The risks and benefits of the circumcision procedure and anesthesia including: bleeding, infection, variability of cosmetic results were discussed.  Informed consent was obtained and a consent form was signed and witnessed.  All questions were answered. Discussed with NICU, will plan once discharged from NICU.  - Hx of PPD and anxiety; no meds   - Plan for discharge Tomorrow.    Information for the patient's :  Amaury Rodriguez [[id number]]   , Low Transverse  Patient doing well without significant complaint. Tolerating regular diet.  Ambulating.  Voiding without difficulty.    Vitals:  /71   Pulse 97   Temp 98.2 °F (36.8 °C) (Oral)   Resp 18   SpO2 96%   Breastfeeding Unknown   Temp (24hrs), Av.2 °F (36.8 °C), Min:98.2 °F (36.8 °C), Max:98.2 °F (36.8 °C)        Exam:       Patient without distress.                 Fundus firm, nontender per nursing fundal checks.     Incision bandaged. Clean, dry, intact.                Perineum with normal lochia noted per nursing assessment.                Lower extremities are negative for pathological edema.    Labs:   Lab Results   Component Value Date/Time    WBC 16.2 2025 05:04 AM    WBC 22.6 2025 06:11 AM    WBC 18.1 2025 02:17 AM    WBC 12.1 2025 09:22 AM    WBC 11.9 03/10/2025 10:11 PM    WBC 16.2 2023 04:22 AM    WBC 9.3 2023 07:59 AM    HGB 8.4 2025 05:04 AM    HGB 8.7 2025 06:11 AM    HGB 12.9 2025 02:17 AM    HGB 14.3 2025 09:22 AM    HGB 13.9 03/10/2025 10:11 PM    HGB 9.1 2023 04:22 AM    HGB 13.1 2023 07:59 AM    HCT 25.7 2025 05:04 AM    HCT 26.7 2025 06:11 AM    HCT 37.9 2025 02:17 AM    HCT 43.0 2025 09:22 AM    HCT 40.8

## 2025-06-27 NOTE — DISCHARGE SUMMARY
Obstetrical Discharge Summary     Name: Ana Rodriguez MRN: [id number]  SSN: [id number]    YOB: 1995  Age: 29 y.o.  Sex: female      Admit Date: 2025    Discharge Date: 2025     Admitting Physician: Martin Lee Jr., MD     Attending Physician:  Bijal Calvo MD     Admission Diagnoses: Normal labor [O80, Z37.9]    Discharge Diagnoses:   Information for the patient's :  Amaury Rodriguez [[id number]]   @[id number]@     Additional Diagnoses:  No components found for: \"OBEXTABORH\", \"OBEXTABSCRN\", \"OBEXTRUBELLA\", \"OBEXTGRBS\"    Hospital Course: Normal hospital course following the delivery. Discharged home on POD3 after repeat  after failed TOLAC. Course complicated by hemorrhage (1400cc EBL). She did receive IV iron during this admission.     Patient Instructions:   Current Discharge Medication List        CONTINUE these medications which have NOT CHANGED    Details   Pdqnqz-K1-Z0-G30-X0-YD (PRENA1 PO) Take by mouth      acetaminophen (TYLENOL) 500 MG tablet Take 2 tablets by mouth in the morning and 2 tablets at noon and 2 tablets in the evening.      Ferrous Sulfate 324 MG TBEC Take 325 mg by mouth 2 times daily (with meals)             Disposition at Discharge: Home or self care    Condition at Discharge: Stable    Reference my discharge instructions.    No follow-ups on file.     Signed By:  BRYCE BLAKELY MD     2025

## 2025-06-27 NOTE — LACTATION NOTE
This note was copied from a baby's chart.  Mother ambulatory in room.  Mother states she pumped 2 oz with last pump session.  Mother states she has been going to NICU and breastfeeding baby.  Mother states baby has been feeding well.  Reviewed lactogenesis with mother, questions answered.  Breasts palpate soft.  Printed material on engorgement given.    Pt will successfully establish breastfeeding by feeding in response to early feeding cues   or wake every 3h, will obtain deep latch, and will keep log of feedings/output.  Taught to BF at hunger cues and or q 2-3 hrs and to offer 10-20 drops of hand expressed colostrum at any non-feeds.      Left Breast: Soft  Left Nipple: Protrude  Right Nipple: Protrude  Right Breast: Soft        Maternal Response: Comfortable           Latch:  (did not see at breast at this time, baby in NICU)

## 2025-06-28 VITALS
RESPIRATION RATE: 15 BRPM | DIASTOLIC BLOOD PRESSURE: 70 MMHG | TEMPERATURE: 98.6 F | HEART RATE: 66 BPM | SYSTOLIC BLOOD PRESSURE: 102 MMHG | OXYGEN SATURATION: 98 %

## 2025-06-28 PROBLEM — Z37.9 NORMAL LABOR: Status: RESOLVED | Noted: 2025-06-25 | Resolved: 2025-06-28

## 2025-06-28 PROBLEM — Z34.90 INTRAUTERINE PREGNANCY: Status: RESOLVED | Noted: 2023-02-24 | Resolved: 2025-06-28

## 2025-06-28 PROBLEM — O47.1 FALSE LABOR AFTER 37 WEEKS OF GESTATION WITHOUT DELIVERY: Status: RESOLVED | Noted: 2025-06-24 | Resolved: 2025-06-28

## 2025-06-28 PROBLEM — Z3A.39 39 WEEKS GESTATION OF PREGNANCY: Status: RESOLVED | Noted: 2025-06-24 | Resolved: 2025-06-28

## 2025-06-28 PROCEDURE — 94761 N-INVAS EAR/PLS OXIMETRY MLT: CPT

## 2025-06-28 PROCEDURE — 6370000000 HC RX 637 (ALT 250 FOR IP): Performed by: STUDENT IN AN ORGANIZED HEALTH CARE EDUCATION/TRAINING PROGRAM

## 2025-06-28 PROCEDURE — 6370000000 HC RX 637 (ALT 250 FOR IP): Performed by: OBSTETRICS & GYNECOLOGY

## 2025-06-28 RX ADMIN — OXYCODONE 10 MG: 5 TABLET ORAL at 05:37

## 2025-06-28 RX ADMIN — IBUPROFEN 800 MG: 800 TABLET ORAL at 00:26

## 2025-06-28 RX ADMIN — OXYCODONE 10 MG: 5 TABLET ORAL at 00:26

## 2025-06-28 RX ADMIN — IBUPROFEN 800 MG: 800 TABLET ORAL at 08:32

## 2025-06-28 RX ADMIN — DOCUSATE SODIUM 100 MG: 100 CAPSULE, LIQUID FILLED ORAL at 08:31

## 2025-06-28 RX ADMIN — ACETAMINOPHEN 1000 MG: 500 TABLET ORAL at 05:37

## 2025-06-28 RX ADMIN — OXYCODONE 10 MG: 5 TABLET ORAL at 09:42

## 2025-06-28 ASSESSMENT — PAIN DESCRIPTION - DESCRIPTORS
DESCRIPTORS: ACHING;SORE
DESCRIPTORS: SORE;ACHING
DESCRIPTORS: ACHING;SORE
DESCRIPTORS: ACHING;SORE

## 2025-06-28 ASSESSMENT — PAIN DESCRIPTION - LOCATION
LOCATION: INCISION
LOCATION: ABDOMEN;INCISION
LOCATION: ABDOMEN
LOCATION: ABDOMEN;INCISION

## 2025-06-28 ASSESSMENT — PAIN DESCRIPTION - ORIENTATION
ORIENTATION: LOWER

## 2025-06-28 ASSESSMENT — PAIN SCALES - GENERAL
PAINLEVEL_OUTOF10: 9
PAINLEVEL_OUTOF10: 8

## 2025-06-28 NOTE — LACTATION NOTE
This note was copied from a baby's chart.     06/28/25 1140   Visit Information   Lactation Consult Visit Type IP Consult Follow Up   Visit Length 15 minutes   Referral Received From Lactation Consultant Follow-up   Reason for Visit Education   Breast Feeding History/Assessment   Left Breast   (deferred)   Feeding Assessment: Maternal Factors   Position and Latch Independently   Care Plan/Breast Care   Lactation Comment Lactation education and support     Mother reports that baby is latched and feeding well. Mother easily expressing colostrum. Mother is soon to be discharged and is confident with breastfeeding. Discussed adequate output, and signs of good feedings.   Warm line number provided.    Pt will successfully establish breastfeeding by feeding in response to early feeding cues   or wake every 3h, will obtain deep latch, and will keep log of feedings/output.  Taught to BF at hunger cues and or q 2-3 hrs and to offer 10-20 drops of hand expressed colostrum at any non-feeds.      Discussed with mother her plan for feeding.  Reviewed the benefits of exclusive breast milk feeding during the hospital stay.   Informed her of the risks of using formula to supplement in the first few days of life as well as the benefits of successful breast milk feeding; referred her to the Breastfeeding booklet about this information.   She acknowledges understanding of information reviewed and states that it is her plan to breastfeed her infant.  Will support her choice and offer additional information as needed.     Chart shows numerous feedings, void, stool WNL.  Discussed importance of monitoring outputs and feedings on first week of life.  Discussed ways to tell if baby is  getting enough breast milk, ie  voids and stools, change in color of stool, and return to birth wt within 2 weeks.  Follow up with pediatrician visit for weight check in 1-2 days (per AAP guidelines.)  Encouraged to call Warm Line  [phone]  for any

## 2025-06-28 NOTE — PROGRESS NOTES
Patient discharged to home, education complete. Patient stated understanding with all questions answered.  Prescriptons: Colace, Iron, Motrin and Oxycodone sent to pharmacy by MD.

## 2025-06-28 NOTE — PROGRESS NOTES
Post-Operative  Day 3    Ana Rodriguez       Patient doing well without complaints. Ready for discharge. Circ today.  Vitals:  /70   Pulse 66   Temp 98.6 °F (37 °C) (Oral)   Resp 15   SpO2 98%   Breastfeeding Unknown   Temp (24hrs), Av.4 °F (36.9 °C), Min:98.1 °F (36.7 °C), Max:98.6 °F (37 °C)    Exam:      Patient without distress.               Abdomen: soft, expected tenderness, fundus firm               Incision clean, dry and intact; Dermabond               Lower extremities are nontender with 2+ edema.      Labs:   Lab Results   Component Value Date/Time    WBC 16.2 2025 05:04 AM    WBC 22.6 2025 06:11 AM    WBC 18.1 2025 02:17 AM    WBC 12.1 2025 09:22 AM    WBC 11.9 03/10/2025 10:11 PM    WBC 16.2 2023 04:22 AM    WBC 9.3 2023 07:59 AM    HGB 8.4 2025 05:04 AM    HGB 8.7 2025 06:11 AM    HGB 12.9 2025 02:17 AM    HGB 14.3 2025 09:22 AM    HGB 13.9 03/10/2025 10:11 PM    HGB 9.1 2023 04:22 AM    HGB 13.1 2023 07:59 AM    HCT 25.7 2025 05:04 AM    HCT 26.7 2025 06:11 AM    HCT 37.9 2025 02:17 AM    HCT 43.0 2025 09:22 AM    HCT 40.8 03/10/2025 10:11 PM    HCT 27.7 2023 04:22 AM    HCT 39.4 2023 07:59 AM     2025 05:04 AM     2025 06:11 AM     2025 02:17 AM     2025 09:22 AM     03/10/2025 10:11 PM     2023 04:22 AM     2023 07:59 AM       No results found for this or any previous visit (from the past 24 hours).    Assessment: Post-Op day 3, doing well    Plan:   1. Discharge home today  2. Instructions given- pelvic rest, restrictions on driving and lifting, wound care instructions, follow-up  3. Discharge Medications: see discharge instruction sheet  4. Circ today

## (undated) DEVICE — PAD,ABDOMINAL,5"X9",ST,LF,25/BX: Brand: MEDLINE INDUSTRIES, INC.

## (undated) DEVICE — GLOVE SURG SZ 7 L12IN FNGR THK79MIL GRN LTX FREE

## (undated) DEVICE — SUT MCRYL 0 36IN CT1 UD --

## (undated) DEVICE — STRAP,POSITIONING,KNEE/BODY,FOAM,4X60": Brand: MEDLINE

## (undated) DEVICE — SYRINGE IRRIG 60ML SFT PLIABLE BLB EZ TO GRP 1 HND USE W/

## (undated) DEVICE — GARMENT,MEDLINE,DVT,INT,CALF,MED, GEN2: Brand: MEDLINE

## (undated) DEVICE — TIP CLEANER: Brand: VALLEYLAB

## (undated) DEVICE — HANDLE LT SNAP ON ULT DURABLE LENS FOR TRUMPF ALC DISPOSABLE

## (undated) DEVICE — CANISTER, RIGID, 3000CC: Brand: MEDLINE INDUSTRIES, INC.

## (undated) DEVICE — SUT VCRL 0 36IN CT VIO --

## (undated) DEVICE — Device

## (undated) DEVICE — STERILE POLYISOPRENE POWDER-FREE SURGICAL GLOVES: Brand: PROTEXIS

## (undated) DEVICE — PREP SKN CHLRAPRP APL 26ML STR --

## (undated) DEVICE — SOLIDIFIER FLUID 3000 CC ABSORB

## (undated) DEVICE — TOTAL TRAY, 16FR 10ML SIL FOLEY, URN: Brand: MEDLINE

## (undated) DEVICE — TOWEL,OR,DSP,ST,BLUE,STD,2/PK,40PK/CS: Brand: MEDLINE

## (undated) DEVICE — SOLUTION IRRIG 1000ML 0.9% SOD CHL USP POUR PLAS BTL

## (undated) DEVICE — C-SECTION II-LF: Brand: MEDLINE INDUSTRIES, INC.

## (undated) DEVICE — GLOVE SURG SZ 65 THK91MIL LTX FREE SYN POLYISOPRENE

## (undated) DEVICE — GOWN,AURORA,FABRIC-REINFORCED,X-LARGE: Brand: MEDLINE

## (undated) DEVICE — SUTURE MCRYL SZ 3-0 L27IN ABSRB UD L19MM PS-2 3/8 CIR PRIM Y427H